# Patient Record
Sex: FEMALE | Race: WHITE | NOT HISPANIC OR LATINO | Employment: UNEMPLOYED | URBAN - METROPOLITAN AREA
[De-identification: names, ages, dates, MRNs, and addresses within clinical notes are randomized per-mention and may not be internally consistent; named-entity substitution may affect disease eponyms.]

---
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
## 2018-09-25 ENCOUNTER — HOSPITAL ENCOUNTER (EMERGENCY)
Facility: HOSPITAL | Age: 18
Discharge: HOME | End: 2018-09-25
Attending: EMERGENCY MEDICINE | Admitting: EMERGENCY MEDICINE

## 2018-09-25 VITALS
DIASTOLIC BLOOD PRESSURE: 70 MMHG | RESPIRATION RATE: 20 BRPM | WEIGHT: 285.27 LBS | TEMPERATURE: 98.5 F | HEART RATE: 115 BPM | BODY MASS INDEX: 44.78 KG/M2 | HEIGHT: 67 IN | SYSTOLIC BLOOD PRESSURE: 142 MMHG | OXYGEN SATURATION: 99 %

## 2018-09-25 DIAGNOSIS — L03.012 CELLULITIS OF FINGER OF LEFT HAND: Primary | ICD-10-CM

## 2018-09-25 PROCEDURE — 99281 EMR DPT VST MAYX REQ PHY/QHP: CPT

## 2018-09-25 RX ORDER — CLINDAMYCIN HYDROCHLORIDE 150 MG/1
300 CAPSULE ORAL ONCE
Status: COMPLETED | OUTPATIENT
Start: 2018-09-25 | End: 2018-09-25

## 2018-09-25 RX ORDER — CLINDAMYCIN HYDROCHLORIDE 300 MG/1
300 CAPSULE ORAL EVERY 8 HOURS SCHEDULED
Qty: 21 CAPSULE | Refills: 0 | Status: SHIPPED | OUTPATIENT
Start: 2018-09-25 | End: 2018-10-02

## 2018-09-25 RX ADMIN — CLINDAMYCIN HYDROCHLORIDE 300 MG: 150 CAPSULE ORAL at 02:46

## 2018-09-25 NOTE — DISCHARGE INSTRUCTIONS
Cellulitis, Ambulatory Care   GENERAL INFORMATION:   Cellulitis  is a skin infection caused by bacteria  Common symptoms include the following:   · Fever    · A red, warm, swollen area on your skin    · Pain when the area is touched    · Bumps or blisters (abscess) that may drain pus    · Bumpy, raised skin that feels like an orange peel  Seek immediate care for the following symptoms:   · An increase in pain, redness, warmth, and size    · Red streaks coming from the infected area    · A thin, gray-brown discharge coming from your infected skin area    · A crackling under your skin when you touch it    · Purple dots or bumps on your skin, or bleeding under your skin    · New swelling and pain in your legs    · Sudden trouble breathing or chest pain  Treatment for cellulitis  may include medicines to treat the bacterial infection or decrease pain  The infection may need to be cleaned out  Damaged, dead, or infected tissue may need to be cut away to help your wound heal   Manage your symptoms:   · Elevate your wound above the level of your heart  as often as you can  This will help decrease swelling and pain  Prop your wound on pillows or blankets to keep it elevated comfortably  · Clean your wound as directed  You may need to wash the wound with soap and water  Look for signs of infection  · Wear pressure stockings as directed  The stockings are tight and put pressure on your legs  This improves blood flow and decreases swelling  Prevent cellulitis:   · Wash your hands often  Use soap and water  Wash your hands after you use the bathroom, change a child's diapers, or sneeze  Wash your hands before you prepare or eat food  Use lotion to prevent dry, cracked skin  · Do not share personal items, such as towels, clothing, and razors  · Clean exercise equipment  with germ-killing  before and after you use it    Follow up with your healthcare provider as directed:  Write down your questions so you remember to ask them during your visits  CARE AGREEMENT:   You have the right to help plan your care  Learn about your health condition and how it may be treated  Discuss treatment options with your caregivers to decide what care you want to receive  You always have the right to refuse treatment  The above information is an  only  It is not intended as medical advice for individual conditions or treatments  Talk to your doctor, nurse or pharmacist before following any medical regimen to see if it is safe and effective for you  © 2014 [address] is for End User's use only and may not be sold, redistributed or otherwise used for commercial purposes  All illustrations and images included in CareNotes® are the copyrighted property of A D A M , Inc  or [facility]  Cellulitis   WHAT YOU NEED TO KNOW:   Cellulitis is a skin infection caused by bacteria  Cellulitis may go away on its own or you may need treatment  Your healthcare provider may draw a [place] around the outside edges of your cellulitis  If your cellulitis spreads, your healthcare provider will see it outside of the [place]  DISCHARGE INSTRUCTIONS:   Call 911 if:   · You have sudden trouble breathing or chest pain  Return to the emergency department if:   · Your wound gets larger and more painful  · You feel a crackling under your skin when you touch it  · You have purple dots or bumps on your skin, or you see bleeding under your skin  · You have new swelling and pain in your legs  · The red, warm, swollen area gets larger  · You see red streaks coming from the infected area  Contact your healthcare provider if:   · You have a fever  · Your fever or pain does not go away or gets worse  · The area does not get smaller after 2 days of antibiotics  · Your skin is flaking or peeling off      · You have questions or concerns about your condition or care   Medicines:   · Antibiotics  help treat the bacterial infection  · NSAIDs , such as ibuprofen, help decrease swelling, pain, and fever  NSAIDs can cause stomach bleeding or kidney problems in certain people  If you take blood thinner medicine, always ask if NSAIDs are safe for you  Always read the medicine label and follow directions  Do not give these medicines to children under 10months of age without direction from your child's healthcare provider  · Acetaminophen  decreases pain and fever  It is available without a doctor's order  Ask how much to take and how often to take it  Follow directions  Read the labels of all other medicines you are using to see if they also contain acetaminophen, or ask your doctor or pharmacist  Acetaminophen can cause liver damage if not taken correctly  Do not use more than 4 grams (4,000 milligrams) total of acetaminophen in one day  · Take your medicine as directed  Contact your healthcare provider if you think your medicine is not helping or if you have side effects  Tell him or her if you are allergic to any medicine  Keep a list of the medicines, vitamins, and herbs you take  Include the amounts, and when and why you take them  Bring the list or the pill bottles to follow-up visits  Carry your medicine list with you in case of an emergency  Self-care:   · Elevate the area above the level of your heart  as often as you can  This will help decrease swelling and pain  Prop the area on pillows or blankets to keep it elevated comfortably  · Clean the area daily until the wound scabs over  Gently wash the area with soap and water  Pat dry  Use dressings as directed  · Place cool or warm, wet cloths on the area as directed  Use clean cloths and clean water  Leave it on the area until the cloth is room temperature  Pat the area dry with a clean, dry cloth  The cloths may help decrease pain    Prevent cellulitis:   · Do not scratch bug bites or areas of injury  You increase your risk for cellulitis by scratching these areas  · Do not share personal items, such as towels, clothing, and razors  · Clean exercise equipment  with germ-killing  before and after you use it  · Wash your hands often  Use soap and water  Wash your hands after you use the bathroom, change a child's diapers, or sneeze  Wash your hands before you prepare or eat food  Use lotion to prevent dry, cracked skin  · Wear pressure stockings as directed  You may be told to wear the stockings if you have peripheral edema  The stockings improve blood flow and decrease swelling  · Treat athlete's foot  This can help prevent the spread of a bacterial skin infection  Follow up with your healthcare provider within 3 days, or as directed: Your healthcare provider will check if your cellulitis is getting better  You may need different medicine  Write down your questions so you remember to ask them during your visits  © 2017 [address] [facility] Information is for End User's use only and may not be sold, redistributed or otherwise used for commercial purposes  All illustrations and images included in CareNotes® are the copyrighted property of A D A M , Inc  or Ori Mccann  The above information is an  only  It is not intended as medical advice for individual conditions or treatments  Talk to your doctor, nurse or pharmacist before following any medical regimen to see if it is safe and effective for you

## 2018-09-25 NOTE — ED PROVIDER NOTES
History  Chief Complaint   Patient presents with   [address]     Per Pt " I think I got bit by a spider  i notice it last Thursday  It is painful "      Patient is a 57-year-old female with complaints of left ring finger infection that began 4 days ago  Patient does not know injury  Fever, chills  Patient states that she did squeeze it initially and some pus came out of it  Patient denies radiating pain, numbness, tingling  Insect Bite   Associated symptoms: no fever        Prior to Admission Medications   Prescriptions Last Dose Informant Patient Reported? Taking? FLUoxetine (PROzac) 20 MG tablet   Yes No   Sig: Take 20 mg by mouth daily   buPROPion (WELLBUTRIN XL) 150 mg 24 hr tablet   Yes No   Sig: Take 150 mg by mouth daily   gabapentin (NEURONTIN) 300 mg capsule   Yes No   Sig: Take 100 mg by mouth 3 (three) times a day   omeprazole (PriLOSEC) 20 mg delayed release capsule   Yes No   Sig: Take 20 mg by mouth daily      Facility-Administered Medications: None       Past Medical History:   Diagnosis Date    Anxiety     Bipolar 1 disorder ([facility] [place] [facility] )     Chemical pneumonitis ([facility] [place] [facility] )     Depression     Gastritis     PTSD (post-traumatic stress disorder)        Past Surgical History:   Procedure Laterality Date    WISDOM TOOTH EXTRACTION         History reviewed  No pertinent family history  I have reviewed and agree with the history as documented  Social History   Substance Use Topics    Smoking status: Current Every Day Smoker     Packs/day: 0 25     Types: Cigarettes    Smokeless tobacco: Never Used    Alcohol use No        Review of Systems   Constitutional: Negative for fever  Respiratory: Negative for shortness of breath  Cardiovascular: Negative for chest pain  Skin: Positive for wound  All other systems reviewed and are negative  Physical Exam  Physical Exam   Constitutional: She is oriented to person, place, and time  She appears well-developed and well-nourished  HENT:   Head: Normocephalic and atraumatic  Eyes: Conjunctivae and EOM are normal  Pupils are equal, round, and reactive to light  Cardiovascular: Normal rate, regular rhythm and normal heart sounds  Pulmonary/Chest: Effort normal    Musculoskeletal:        Hands:  Range of motion of the fingers full  There is no evidence of infectious flexor tenosynovitis  Neurological: She is alert and oriented to person, place, and time  Psychiatric: She has a normal mood and affect  Her behavior is normal  Judgment and thought content normal    Vitals reviewed  Vital Signs  ED Triage Vitals [09/25/18 0223]   Temperature Pulse Respirations Blood Pressure SpO2   98 5 °F (36 9 °C) (!) 115 20 142/70 99 %      Temp Source Heart Rate Source Patient Position - Orthostatic VS BP Location FiO2 (%)   Oral Monitor Sitting Right arm --      Pain Score       --           Vitals:    09/25/18 0223   BP: 142/70   Pulse: (!) 115   Patient Position - Orthostatic VS: Sitting       Visual Acuity      ED Medications  Medications   clindamycin (CLEOCIN) capsule 300 mg (300 mg Oral Given 9/25/18 0246)       Diagnostic Studies  Results Reviewed     None                 No orders to display              Procedures  Procedures       Phone Contacts  ED Phone Contact    ED Course                               MDM  Number of Diagnoses or Management Options  Cellulitis of finger of left hand: new and does not require workup  Diagnosis management comments: History and physical exam consistent with small abscess and cellulitis left ring finger  There is no fluctuance noted for fluid collection  Recommend treatment with oral clindamycin  There is no indication for I and D at this time  Follow up with family doctor in 2-3 days      Risk of Complications, Morbidity, and/or Mortality  Presenting problems: moderate  Diagnostic procedures: moderate  Management options: moderate    Patient Progress  Patient progress: stable    CritCare Time    Disposition  Final diagnoses:   Cellulitis of finger of left hand     Time reflects when diagnosis was documented in both MDM as applicable and the Disposition within this note     Time User Action Codes Description Comment    9/25/2018  2:42 AM Aleida Larsen Add [L03 [id number]] Cellulitis of finger of left hand       ED Disposition     ED Disposition Condition Comment    Discharge  Sharath Eleuterio discharge to home/self care  Condition at discharge: Stable        Follow-up Information     Follow up With Specialties Details Why Contact Info    PCP  In 2 days            Discharge Medication List as of 9/25/2018  2:45 AM      START taking these medications    Details   clindamycin (CLEOCIN) 300 MG capsule Take 1 capsule (300 mg total) by mouth every 8 (eight) hours for 7 days, Starting Tue 9/25/2018, Until Tue 10/2/2018, Print         CONTINUE these medications which have NOT CHANGED    Details   buPROPion (WELLBUTRIN XL) 150 mg 24 hr tablet Take 150 mg by mouth daily, Historical Med      FLUoxetine (PROzac) 20 MG tablet Take 20 mg by mouth daily, Historical Med      gabapentin (NEURONTIN) 300 mg capsule Take 100 mg by mouth 3 (three) times a day, Historical Med      omeprazole (PriLOSEC) 20 mg delayed release capsule Take 20 mg by mouth daily, Historical Med           No discharge procedures on file      ED Provider  Electronically Signed by           Yasmeen Terry PA-C  09/25/18 0745

## 2018-10-10 ENCOUNTER — OFFICE VISIT (OUTPATIENT)
Dept: OBGYN CLINIC | Facility: CLINIC | Age: 18
End: 2018-10-10

## 2018-10-10 VITALS
DIASTOLIC BLOOD PRESSURE: 82 MMHG | BODY MASS INDEX: 44.89 KG/M2 | HEIGHT: 67 IN | SYSTOLIC BLOOD PRESSURE: 132 MMHG | WEIGHT: 286 LBS

## 2018-10-10 DIAGNOSIS — Z11.3 SCREEN FOR STD (SEXUALLY TRANSMITTED DISEASE): ICD-10-CM

## 2018-10-10 DIAGNOSIS — Z01.419 WELL WOMAN EXAM WITH ROUTINE GYNECOLOGICAL EXAM: Primary | ICD-10-CM

## 2018-10-10 PROCEDURE — 87491 CHLMYD TRACH DNA AMP PROBE: CPT | Performed by: OBSTETRICS & GYNECOLOGY

## 2018-10-10 PROCEDURE — 87591 N.GONORRHOEAE DNA AMP PROB: CPT | Performed by: OBSTETRICS & GYNECOLOGY

## 2018-10-10 PROCEDURE — 99385 PREV VISIT NEW AGE 18-39: CPT | Performed by: OBSTETRICS & GYNECOLOGY

## 2018-10-10 NOTE — PROGRESS NOTES
ASSESSMENT & PLAN: Edilberto Smith is a 19 y o  Ibis Martin with normal gynecologic exam     1   Routine well woman exam done today  2  Pap: Will begin cervical surveillance  3  STD testing  was performed today  4  Gardasil recommendations reviewed  Patient has previously been vaccinated  5  The following were reviewed in today's visit: breast self exam, STD testing, use and side effects of OCPs, adequate intake of calcium and vitamin D, exercise and healthy diet  6  Contraception : none  CC:  Annual Gynecologic Examination    HPI: Edilberto Smith is a 19 y o  Ibis Martin who presents for annual gynecologic examination  She has the following concerns:  none    Health Maintenance:    She exercises 1 days per week with cardio  She wears her seatbelt routinely  She does not perform regular monthly self breast exams  She feels safe at home  Patients does not follow a particular diet  Her last pap was : never      Past Medical History:   Diagnosis Date    Anxiety     B[facility] disorder ([facility] )     Chemical pneumonitis ([facility] )     Depression     Gastritis     PTSD (post-traumatic stress disorder)        Past Surgical History:   Procedure Laterality Date    WISDOM TOOTH EXTRACTION         Past OB/Gyn History:  OB History      Para Term  AB Living    0 0 0 0 0 0    SAB TAB Ectopic Multiple Live Births    0 0 0 0 0      ,  Period Cycle (Days): 30  Period Duration (Days): 4-5  Period Pattern: Regular  Menstrual Flow: Heavy  Menstrual Control: Tampon, Thin pad  Dysmenorrhea: (!) Moderate  Dysmenorrhea Symptoms: Cramping, HeadachePatient's last menstrual period was 2018  History of sexually transmitted infection No  History of abnormal pap smears  Never had a pap     Patient is currently sexually active  homosexual Birth control: none        Family History   Problem Relation Age of Onset    Stroke Mother     Heart disease Mother     Bipolar disorder Father     Heart disease Sister    Nona Conte Ovarian cysts Sister        Social History:  Social History     Social History    Marital status: Single     Spouse name: N/A    Number of children: N/A    Years of education: N/A     Occupational History    Not on file  Social History Main Topics    Smoking status: Current Every Day Smoker     Packs/day: 0 25     Types: Cigarettes    Smokeless tobacco: Never Used    Alcohol use Yes      Comment: social    Drug use: No    Sexual activity: Yes     Partners: Female     Other Topics Concern    Not on file     Social History Narrative    No narrative on file     Presently lives with parents  Patient is same sex partner  Patient is currently employed [employer]    No Known Allergies  No current outpatient prescriptions on file  Review of Systems:  A complete review of systems was performed and was negative, except as listed  none    Physical Exam:  /82   Ht 5' 7" (1 702 m)   Wt 130 kg (286 lb)   LMP 09/17/2018   BMI 44 79 kg/m²    GEN: The patient was alert and oriented x3, pleasant well-appearing female in no acute distress  HEENT:  Unremarkable, no anterior or posterior lymphadenopathy, no thyromegaly  CV:  RRR, no murmurs  RESP:  Clear to auscultation bilaterally  BREAST:  Symmetric breasts with no palpable breast masses or obvious breast lesions  She has no retractions or nipple discharge  She has no axillary abnormalities or palpable masses  Self breast exam is taught  ABD:  Soft, nontender, nondistended, normoactive bowel sounds,   EXT:  WWP, nontender, no edema  BACK:  No CVA tenderness, no tenderness to palpation along spine  PELVIC:  Normal appearing external female genitalia, normal vaginal epithelium, No discharge  Cervix present   Bimanual: absent CMT,  normal uterus, non-tender  No palpable adnexal masses

## 2018-10-11 ENCOUNTER — LAB (OUTPATIENT)
Dept: LAB | Facility: CLINIC | Age: 18
End: 2018-10-11

## 2018-10-11 DIAGNOSIS — Z11.3 SCREEN FOR STD (SEXUALLY TRANSMITTED DISEASE): ICD-10-CM

## 2018-10-11 LAB
CHLAMYDIA DNA CVX QL NAA+PROBE: NORMAL
N GONORRHOEA DNA GENITAL QL NAA+PROBE: NORMAL

## 2018-10-11 PROCEDURE — 87340 HEPATITIS B SURFACE AG IA: CPT

## 2018-10-11 PROCEDURE — 87389 HIV-1 AG W/HIV-1&-2 AB AG IA: CPT

## 2018-10-11 PROCEDURE — 36415 COLL VENOUS BLD VENIPUNCTURE: CPT

## 2018-10-11 PROCEDURE — 86592 SYPHILIS TEST NON-TREP QUAL: CPT

## 2018-10-12 LAB
HBV SURFACE AG SER QL: NORMAL
HIV 1+2 AB+HIV1 P24 AG SERPL QL IA: NORMAL
RPR SER QL: NORMAL

## 2018-10-15 ENCOUNTER — HOSPITAL ENCOUNTER (EMERGENCY)
Facility: HOSPITAL | Age: 18
Discharge: HOME | End: 2018-10-15
Attending: EMERGENCY MEDICINE

## 2018-10-15 VITALS
RESPIRATION RATE: 18 BRPM | TEMPERATURE: 98.4 F | HEART RATE: 94 BPM | BODY MASS INDEX: 44.89 KG/M2 | WEIGHT: 286.6 LBS | OXYGEN SATURATION: 98 % | SYSTOLIC BLOOD PRESSURE: 153 MMHG | DIASTOLIC BLOOD PRESSURE: 89 MMHG

## 2018-10-15 DIAGNOSIS — N39.0 UTI (URINARY TRACT INFECTION): Primary | ICD-10-CM

## 2018-10-15 LAB
BACTERIA UR QL AUTO: ABNORMAL /HPF
BILIRUB UR QL STRIP: NEGATIVE
CLARITY UR: CLEAR
COLOR UR: ABNORMAL
EXT PREG TEST URINE: NEGATIVE
GLUCOSE UR STRIP-MCNC: NEGATIVE MG/DL
HGB UR QL STRIP.AUTO: ABNORMAL
KETONES UR STRIP-MCNC: NEGATIVE MG/DL
LEUKOCYTE ESTERASE UR QL STRIP: ABNORMAL
NITRITE UR QL STRIP: NEGATIVE
NON-SQ EPI CELLS URNS QL MICRO: ABNORMAL /HPF
PH UR STRIP.AUTO: 6 [PH] (ref 4.5–8)
PROT UR STRIP-MCNC: ABNORMAL MG/DL
RBC #/AREA URNS AUTO: ABNORMAL /HPF
SP GR UR STRIP.AUTO: 1.02 (ref 1–1.03)
UROBILINOGEN UR QL STRIP.AUTO: 0.2 E.U./DL
WBC #/AREA URNS AUTO: ABNORMAL /HPF

## 2018-10-15 PROCEDURE — 81001 URINALYSIS AUTO W/SCOPE: CPT | Performed by: EMERGENCY MEDICINE

## 2018-10-15 PROCEDURE — 99283 EMERGENCY DEPT VISIT LOW MDM: CPT

## 2018-10-15 PROCEDURE — 81025 URINE PREGNANCY TEST: CPT | Performed by: EMERGENCY MEDICINE

## 2018-10-15 RX ORDER — CEPHALEXIN 250 MG/1
500 CAPSULE ORAL ONCE
Status: COMPLETED | OUTPATIENT
Start: 2018-10-15 | End: 2018-10-15

## 2018-10-15 RX ORDER — CEPHALEXIN 500 MG/1
500 CAPSULE ORAL 2 TIMES DAILY
Qty: 14 CAPSULE | Refills: 0 | Status: SHIPPED | OUTPATIENT
Start: 2018-10-15 | End: 2018-10-22

## 2018-10-15 RX ADMIN — CEPHALEXIN 500 MG: 250 CAPSULE ORAL at 22:28

## 2018-10-16 NOTE — ED PROVIDER NOTES
History  Chief Complaint   Patient presents with    Possible UTI     Sharp feeling when urinating  Onset Saturday  19year-old female presents with 1 day of dysuria noted as moderate sharp pain at the end of her stream associated with frequency and urgency  Some hematuria yesterday, none today  Patient notes urination aggravates the pain and nothing alleviates it  ROS: Patient associates diarrhea that is chronic due to gastritis and mild suprapubic that she describes as a soreness  Patient does not some mild vaginal discharge, just had evaluation by OBGYN, results reviewed with negative GC endocervical testing; denies fever/chills, nausea/vomiting, flank pain, dyspnea, anorexia, chest pain, vaginal bleeding or odor, or back/neck pain  Patient affirms history of UTIs  Patient denies pregnancy  Patient denies recent unprotected sexual activities  Objective:  Vital signs reviewed  Constitutional:  Well-nourished, well-developed  No acute distress  HENT: Head normocephalic  Pharynx moist   CV:  Regular rate  Respiratory:  Non-labored  Abdomen:  Normal inspection of an obese abdomen without previous abdominal surgical incisions noted and without erythema, rashes or ecchymosis noted  No abdominal pulsations noted  Soft abdomen  No tnderness to palpitation in the suprapubic region with no tenderness in the remainder of the abdominal fields, including none over McBurney's point  No masses noted on examination  No rebound or guarding noted on examination, non-peritoneal exam     Back:  Costovertebral tenderness not present  Skin:  No ecchymosis of the umbilicus (negative Oolitic's sign) or flank (negative Grey Nguyen's sign)  Warm and dry  Neuro:  Alert  Answers questions appropriately  Psych: Normal mood and affect  Medical Decision Making    Patient presents with signs and symptoms most consistent with cystitis    Differential includes cystitis, urethritis, vaginitis, pelvic inflammatory disease, nephrolithiasis, and pyelonephritis  Patient denies vaginal symptoms making vaginitis and pelvic inflammatory disease less likely  Patient denies flank pain so current symptoms and exams inconsistent with pyelonephritis or nephrolithiasis  Most likely cystitis based on patient's history and examination  Urinalysis will be completed, including urine culture if indicated by initial microscopy  Urine pregnancy will be completed  Patient does note vaginal discharge but declines evaluation regarding this and had recent evaluation by OBGYN, including negative GC testing that I reviewed in the medical record  Patient denies current treatment for cystitis  Patient denies poorly controlled diabetes, chronic kidney disease, history of congenital urinary abnormality or renal transplant, or history of immunocompromised state  As such, patient will be treated for acute uncomplicated cystitis  Initial dose of antibiotic will be given in the ER  Patient instructed on urinary tract infections and we have discussed return precautions in detail  None       Past Medical History:   Diagnosis Date    ADHD     Anxiety     Bipolar 1 disorder (HCC)     Chemical pneumonitis (HCC)     Depression     Gastritis     PTSD (post-traumatic stress disorder)        Past Surgical History:   Procedure Laterality Date    WISDOM TOOTH EXTRACTION         Family History   Problem Relation Age of Onset    Stroke Mother     Heart disease Mother     Bipolar disorder Father     Heart disease Sister     Ovarian cysts Sister      I have reviewed and agree with the history as documented  Social History   Substance Use Topics    Smoking status: Current Every Day Smoker     Packs/day: 0 50     Types: Cigarettes    Smokeless tobacco: Never Used    Alcohol use Yes      Comment: social        Review of Systems   All other systems reviewed and are negative        Physical Exam  Physical Exam    Vital Signs  ED Triage Vitals [10/15/18 2036]   Temperature Pulse Respirations Blood Pressure SpO2   98 4 °F (36 9 °C) 94 18 153/89 98 %      Temp Source Heart Rate Source Patient Position - Orthostatic VS BP Location FiO2 (%)   Oral Monitor Sitting Left arm --      Pain Score       No Pain           Vitals:    10/15/18 2036   BP: 153/89   Pulse: 94   Patient Position - Orthostatic VS: Sitting       Visual Acuity      ED Medications  Medications   cephalexin (KEFLEX) capsule 500 mg (500 mg Oral Given 10/15/18 2228)       Diagnostic Studies  Results Reviewed     Procedure Component Value Units Date/Time    Urine Microscopic [[id number]]  (Abnormal) Collected:  10/15/18 2129    Lab Status:  Final result Specimen:  Urine from Urine, Clean Catch Updated:  10/15/18 2148     RBC, UA 4-10 (A) /hpf      WBC, UA 4-10 (A) /hpf      Epithelial Cells Occasional /hpf      Bacteria, UA Moderate (A) /hpf     UA w Reflex to Microscopic w Reflex to Culture [[id number]]  (Abnormal) Collected:  10/15/18 2129    Lab Status:  Final result Specimen:  Urine from Urine, Clean Catch Updated:  10/15/18 2137     Color, UA Light Yellow     Clarity, UA Clear     Specific [place], UA 1 020     pH, UA 6 0     Leukocytes, UA Trace (A)     Nitrite, UA Negative     Protein, UA Trace (A) mg/dl      Glucose, UA Negative mg/dl      Ketones, UA Negative mg/dl      Urobilinogen, UA 0 2 E U /dl      Bilirubin, UA Negative     Blood, UA Moderate (A)    POCT pregnancy, urine [[id number]]  (Normal) Resulted:  10/15/18 2126    Lab Status:  Final result Updated:  10/15/18 2126     EXT PREG TEST UR (Ref: Negative) negative                 No orders to display              Procedures  Procedures       Phone Contacts  ED Phone Contact    ED Course  ED Course as of Oct 21 2122   Mon Oct 15, 2018   2208 Discussed findings of urinalysis with the patient  Will treat patient with Keflex and a follow for urinary culture  Patient has no flank pain or fever/chills    Discussed signs and symptoms of pyelonephritis  Treating with cephalexin which should cover regardless  Discussed again patient's vaginal discharge in after discussion of risks and benefits, patient deferred testing at this point  Discussed [id number] with OBGYN if symptoms continue  Patient's recent GC testing was negative with no new sexual partners  Discussed follow up and return precautions  Discussed symptomatic management, patient declined herbert  Discussed NSAIDs  MDM  CritCare Time    Disposition  Final diagnoses:   UTI (urinary tract infection)     Time reflects when diagnosis was documented in both MDM as applicable and the Disposition within this note     Time User Action Codes Description Comment    10/15/2018 10:04 PM Linda Quinones Add [N39 0] UTI (urinary tract infection)       ED Disposition     ED Disposition Condition Comment    Discharge  Ritu Lopez discharge to home/self care  Condition at discharge: Stable        Follow-up Information     Follow up With Specialties Details Why [address] [facility], [place] [facility] Medicine Schedule an appointment as soon as possible for a visit in 3 days Follow up and reassessment  [address] [place] [address]  [phone]            Discharge Medication List as of 10/15/2018 10:05 PM      START taking these medications    Details   cephalexin (KEFLEX) 500 mg capsule Take 1 capsule (500 mg total) by mouth 2 (two) times a day for 7 days, Starting Mon 10/15/2018, Until Mon 10/22/2018, Print           No discharge procedures on file      ED Provider  Electronically Signed by           Adriane Joe MD  10/21/18 5733

## 2018-10-16 NOTE — DISCHARGE INSTRUCTIONS

## 2019-01-26 ENCOUNTER — APPOINTMENT (EMERGENCY)
Dept: CT IMAGING | Facility: HOSPITAL | Age: 19
End: 2019-01-26

## 2019-01-26 ENCOUNTER — HOSPITAL ENCOUNTER (EMERGENCY)
Facility: HOSPITAL | Age: 19
Discharge: HOME | End: 2019-01-26
Attending: EMERGENCY MEDICINE | Admitting: EMERGENCY MEDICINE

## 2019-01-26 VITALS
TEMPERATURE: 98.1 F | BODY MASS INDEX: 42.38 KG/M2 | RESPIRATION RATE: 18 BRPM | WEIGHT: 270 LBS | DIASTOLIC BLOOD PRESSURE: 56 MMHG | OXYGEN SATURATION: 99 % | HEART RATE: 77 BPM | SYSTOLIC BLOOD PRESSURE: 116 MMHG | HEIGHT: 67 IN
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
## 2019-01-26 DIAGNOSIS — S66.911A WRIST STRAIN, RIGHT, INITIAL ENCOUNTER: ICD-10-CM

## 2019-01-26 DIAGNOSIS — S39.012A STRAIN OF LUMBAR REGION, INITIAL ENCOUNTER: ICD-10-CM

## 2019-01-26 LAB
ANION GAP BLD CALC-SCNC: 18 MMOL/L (ref 4–13)
BUN BLD-MCNC: 14 MG/DL (ref 5–25)
CA-I BLD-SCNC: 1.22 MMOL/L (ref 1.12–1.32)
CHLORIDE BLD-SCNC: 104 MMOL/L (ref 100–108)
CREAT BLD-MCNC: 0.7 MG/DL (ref 0.6–1.3)
EXT PREG TEST URINE: NEGATIVE
GFR SERPL CREATININE-BSD FRML MDRD: 125 ML/MIN/1.73SQ M
GLUCOSE SERPL-MCNC: 90 MG/DL (ref 65–140)
HCT VFR BLD CALC: 42 % (ref 34.8–46.1)
HGB BLDA-MCNC: 14.3 G/DL (ref 11.5–15.4)
PCO2 BLD: 24 MMOL/L (ref 21–32)
POTASSIUM BLD-SCNC: 3.5 MMOL/L (ref 3.5–5.3)
SODIUM BLD-SCNC: 141 MMOL/L (ref 136–145)
SPECIMEN SOURCE: ABNORMAL

## 2019-01-26 PROCEDURE — 80047 BASIC METABLC PNL IONIZED CA: CPT

## 2019-01-26 PROCEDURE — 74177 CT ABD & PELVIS W/CONTRAST: CPT

## 2019-01-26 PROCEDURE — 70450 CT HEAD/BRAIN W/O DYE: CPT

## 2019-01-26 PROCEDURE — 81025 URINE PREGNANCY TEST: CPT | Performed by: EMERGENCY MEDICINE

## 2019-01-26 PROCEDURE — 99284 EMERGENCY DEPT VISIT MOD MDM: CPT

## 2019-01-26 PROCEDURE — 85014 HEMATOCRIT: CPT

## 2019-01-26 RX ADMIN — IOHEXOL 100 ML: 350 INJECTION, SOLUTION INTRAVENOUS at 01:07
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
## 2019-04-29 ENCOUNTER — HOSPITAL ENCOUNTER (EMERGENCY)
Facility: HOSPITAL | Age: 19
Discharge: HOME | End: 2019-04-29
Attending: EMERGENCY MEDICINE

## 2019-04-29 VITALS
TEMPERATURE: 97.9 F | WEIGHT: 279.1 LBS | OXYGEN SATURATION: 98 % | SYSTOLIC BLOOD PRESSURE: 166 MMHG | HEIGHT: 67 IN | BODY MASS INDEX: 43.81 KG/M2 | DIASTOLIC BLOOD PRESSURE: 79 MMHG | HEART RATE: 84 BPM | RESPIRATION RATE: 18 BRPM

## 2019-04-29 DIAGNOSIS — B35.4 TINEA CORPORIS: Primary | ICD-10-CM

## 2019-04-29 PROCEDURE — 99283 EMERGENCY DEPT VISIT LOW MDM: CPT | Performed by: PHYSICIAN ASSISTANT

## 2019-04-29 PROCEDURE — 99282 EMERGENCY DEPT VISIT SF MDM: CPT

## 2019-04-29 RX ORDER — CLOTRIMAZOLE 1 %
1 CREAM (GRAM) TOPICAL 2 TIMES DAILY
Qty: 45 G | Refills: 0 | Status: SHIPPED | OUTPATIENT
Start: 2019-04-29 | End: 2022-04-24

## 2019-09-22 ENCOUNTER — HOSPITAL ENCOUNTER (EMERGENCY)
Facility: HOSPITAL | Age: 19
Discharge: HOME | End: 2019-09-22
Attending: EMERGENCY MEDICINE | Admitting: EMERGENCY MEDICINE
Payer: COMMERCIAL

## 2019-09-22 VITALS
SYSTOLIC BLOOD PRESSURE: 139 MMHG | RESPIRATION RATE: 16 BRPM | HEART RATE: 91 BPM | TEMPERATURE: 98.2 F | WEIGHT: 279.32 LBS | BODY MASS INDEX: 43.75 KG/M2 | OXYGEN SATURATION: 96 % | DIASTOLIC BLOOD PRESSURE: 80 MMHG

## 2019-09-22 DIAGNOSIS — N39.0 UTI (URINARY TRACT INFECTION): Primary | ICD-10-CM

## 2019-09-22 LAB
BACTERIA UR QL AUTO: ABNORMAL /HPF
BILIRUB UR QL STRIP: NEGATIVE
CLARITY UR: ABNORMAL
COLOR UR: YELLOW
EXT PREG TEST URINE: NEGATIVE
EXT. CONTROL ED NAV: NORMAL
GLUCOSE UR STRIP-MCNC: NEGATIVE MG/DL
HGB UR QL STRIP.AUTO: ABNORMAL
KETONES UR STRIP-MCNC: NEGATIVE MG/DL
LEUKOCYTE ESTERASE UR QL STRIP: NEGATIVE
NITRITE UR QL STRIP: NEGATIVE
NON-SQ EPI CELLS URNS QL MICRO: ABNORMAL /HPF
PH UR STRIP.AUTO: 5.5 [PH]
PROT UR STRIP-MCNC: ABNORMAL MG/DL
RBC #/AREA URNS AUTO: ABNORMAL /HPF
SP GR UR STRIP.AUTO: >=1.03 (ref 1–1.03)
UROBILINOGEN UR QL STRIP.AUTO: 0.2 E.U./DL
WBC #/AREA URNS AUTO: ABNORMAL /HPF

## 2019-09-22 PROCEDURE — 99283 EMERGENCY DEPT VISIT LOW MDM: CPT

## 2019-09-22 PROCEDURE — 99284 EMERGENCY DEPT VISIT MOD MDM: CPT | Performed by: EMERGENCY MEDICINE

## 2019-09-22 PROCEDURE — 81025 URINE PREGNANCY TEST: CPT | Performed by: EMERGENCY MEDICINE

## 2019-09-22 PROCEDURE — 87147 CULTURE TYPE IMMUNOLOGIC: CPT | Performed by: EMERGENCY MEDICINE

## 2019-09-22 PROCEDURE — 81001 URINALYSIS AUTO W/SCOPE: CPT | Performed by: EMERGENCY MEDICINE

## 2019-09-22 PROCEDURE — 87086 URINE CULTURE/COLONY COUNT: CPT | Performed by: EMERGENCY MEDICINE

## 2019-09-22 PROCEDURE — 87077 CULTURE AEROBIC IDENTIFY: CPT | Performed by: EMERGENCY MEDICINE

## 2019-09-22 RX ORDER — CEPHALEXIN 500 MG/1
500 CAPSULE ORAL 2 TIMES DAILY
Qty: 14 CAPSULE | Refills: 0 | Status: SHIPPED | OUTPATIENT
Start: 2019-09-22 | End: 2019-09-29

## 2019-09-22 RX ORDER — CEPHALEXIN 250 MG/1
500 CAPSULE ORAL ONCE
Status: COMPLETED | OUTPATIENT
Start: 2019-09-22 | End: 2019-09-22

## 2019-09-22 RX ORDER — PHENAZOPYRIDINE HYDROCHLORIDE 100 MG/1
100 TABLET, FILM COATED ORAL ONCE
Status: COMPLETED | OUTPATIENT
Start: 2019-09-22 | End: 2019-09-22

## 2019-09-22 RX ORDER — PHENAZOPYRIDINE HYDROCHLORIDE 100 MG/1
100 TABLET, FILM COATED ORAL 3 TIMES DAILY
Qty: 6 TABLET | Refills: 0 | Status: SHIPPED | OUTPATIENT
Start: 2019-09-22 | End: 2019-09-24

## 2019-09-22 RX ADMIN — CEPHALEXIN 500 MG: 250 CAPSULE ORAL at 04:54

## 2019-09-22 RX ADMIN — PHENAZOPYRIDINE 100 MG: 100 TABLET ORAL at 04:54

## 2019-09-22 NOTE — ED PROVIDER NOTES
History  Chief Complaint   Patient presents with    Painful Urination     Pt reports pain with urination x3 days with frequency that progressed to right flank pain  History provided by:  Patient  Difficulty Urinating   Pain quality:  Burning  Pain severity:  Mild  Onset quality:  Gradual  Duration:  4 days  Timing:  Constant  Progression:  Worsening  Chronicity:  New  Recent urinary tract infections: no    Relieved by:  None tried  Worsened by:  Nothing  Ineffective treatments:  None tried  Urinary symptoms: frequent urination and hematuria (pink when she wiped)    Urinary symptoms: no discolored urine, no hesitancy and no bladder incontinence    Associated symptoms: flank pain (some right flank achiness)    Associated symptoms: no abdominal pain, no fever, no nausea, no vaginal discharge and no vomiting    Risk factors: not pregnant and no recurrent urinary tract infections        Prior to Admission Medications   Prescriptions Last Dose Informant Patient Reported? Taking? clotrimazole (LOTRIMIN) 1 % cream   No No   Sig: Apply 1 g (1 application total) topically 2 (two) times a day Until rash resolves  Continue application for 1 week after the rash resolves      Facility-Administered Medications: None       Past Medical History:   Diagnosis Date    ADHD     Anxiety     Bipolar 1 disorder (HCC)     Chemical pneumonitis (HCC)     Depression     Gastritis     PTSD (post-traumatic stress disorder)        Past Surgical History:   Procedure Laterality Date    WISDOM TOOTH EXTRACTION         Family History   Problem Relation Age of Onset    Stroke Mother     Heart disease Mother     Bipolar disorder Father     Heart disease Sister     Ovarian cysts Sister      I have reviewed and agree with the history as documented      Social History     Tobacco Use    Smoking status: Current Every Day Smoker     Packs/day: 0 50     Types: Cigarettes    Smokeless tobacco: Never Used   Substance Use Topics    Alcohol use: Yes     Comment: social    Drug use: Yes     Types: Marijuana        Review of Systems   Constitutional: Negative for fever  Gastrointestinal: Negative for abdominal pain, nausea and vomiting  Genitourinary: Positive for dysuria and flank pain (some right flank achiness)  Negative for vaginal discharge  All other systems reviewed and are negative  Physical Exam  Physical Exam   Constitutional: She appears well-developed and well-nourished  No distress  HENT:   Head: Normocephalic and atraumatic  Eyes: EOM are normal    Neck: Neck supple  Cardiovascular: Normal rate  Pulmonary/Chest: Effort normal  No respiratory distress  Abdominal: Soft  She exhibits no distension  There is no tenderness  There is no CVA tenderness  Neurological: She is alert  Skin: Skin is warm  She is not diaphoretic  Psychiatric: She has a normal mood and affect  Nursing note and vitals reviewed  Vital Signs  ED Triage Vitals [09/22/19 0405]   Temperature Pulse Respirations Blood Pressure SpO2   98 2 °F (36 8 °C) 91 16 139/80 96 %      Temp Source Heart Rate Source Patient Position - Orthostatic VS BP Location FiO2 (%)   Oral Monitor -- Right arm --      Pain Score       4           Vitals:    09/22/19 0405   BP: 139/80   Pulse: 91         Visual Acuity      ED Medications  Medications   cephalexin (KEFLEX) capsule 500 mg (500 mg Oral Given 9/22/19 0454)   phenazopyridine (PYRIDIUM) tablet 100 mg (100 mg Oral Given 9/22/19 0454)       Diagnostic Studies  Results Reviewed     Procedure Component Value Units Date/Time    Urine Microscopic [[id number]]  (Abnormal) Collected:  09/22/19 0413    Lab Status:  Final result Specimen:  Urine, Clean Catch Updated:  09/22/19 0436     RBC, UA 30-50 /hpf      WBC, UA Innumerable /hpf      Epithelial Cells Occasional /hpf      Bacteria, UA Innumerable /hpf     Urine culture [[id number]] Collected:  09/22/19 0413    Lab Status:   In process Specimen:  Urine, Clean Catch Updated:  09/22/19 0436    UA w Reflex to Microscopic w Reflex to Culture [[id number]]  (Abnormal) Collected:  09/22/19 0413    Lab Status:  Final result Specimen:  Urine, Clean Catch Updated:  09/22/19 0432     Color, UA Yellow     Clarity, UA Slightly Cloudy     Specific Gravity, UA >=1 030     pH, UA 5 5     Leukocytes, UA Negative     Nitrite, UA Negative     Protein,  (2+) mg/dl      Glucose, UA Negative mg/dl      Ketones, UA Negative mg/dl      Urobilinogen, UA 0 2 E U /dl      Bilirubin, UA Negative     Blood, UA Large    POCT pregnancy, urine [[id number]]  (Normal) Resulted:  09/22/19 0419    Lab Status:  Final result Updated:  09/22/19 0419     EXT PREG TEST UR (Ref: Negative) negative     Control valid                 No orders to display              Procedures  Procedures       ED Course                               MDM  Number of Diagnoses or Management Options  UTI (urinary tract infection): new and requires workup     Amount and/or Complexity of Data Reviewed  Clinical lab tests: reviewed and ordered        Disposition  Final diagnoses:   UTI (urinary tract infection)     Time reflects when diagnosis was documented in both MDM as applicable and the Disposition within this note     Time User Action Codes Description Comment    9/22/2019  4:39 AM Tad MCALLISTER Add [N39 0] UTI (urinary tract infection)       ED Disposition     ED Disposition Condition Date/Time Comment    Discharge Stable Sun Sep 22, 2019  4:38 AM Valente Velarde discharge to home/self care              Follow-up Information     Follow up With Specialties Details Why Contact Info Additional [address] [facility] Emergency Department Emergency Medicine Go to  If symptoms worsen [address] [place] [address] ED, [address] [facility], [address], MD Family Medicine Call  If symptoms worsen [address] [facility] [address]  [phone]             Discharge Medication List as of 9/22/2019  4:39 AM      START taking these medications    Details   cephalexin (KEFLEX) 500 mg capsule Take 1 capsule (500 mg total) by mouth 2 (two) times a day for 7 days, Starting Sun 9/22/2019, Until Sun 9/29/2019, Print      phenazopyridine (PYRIDIUM) 100 mg tablet Take 1 tablet (100 mg total) by mouth 3 (three) times a day for 2 days, Starting Sun 9/22/2019, Until Tue 9/24/2019, Print         CONTINUE these medications which have NOT CHANGED    Details   clotrimazole (LOTRIMIN) 1 % cream Apply 1 g (1 application total) topically 2 (two) times a day Until rash resolves  Continue application for 1 week after the rash resolves, Starting Mon 4/29/2019, Print           No discharge procedures on file      ED Provider  Electronically Signed by           Violeta Barfield MD  09/22/19 2983

## 2019-09-23 LAB — BACTERIA UR CULT: ABNORMAL

## 2020-02-17 ENCOUNTER — HOSPITAL ENCOUNTER (EMERGENCY)
Facility: HOSPITAL | Age: 20
Discharge: HOME | End: 2020-02-17
Payer: COMMERCIAL

## 2020-02-17 VITALS
DIASTOLIC BLOOD PRESSURE: 64 MMHG | BODY MASS INDEX: 44.29 KG/M2 | WEIGHT: 282.19 LBS | RESPIRATION RATE: 18 BRPM | HEIGHT: 67 IN | HEART RATE: 81 BPM | SYSTOLIC BLOOD PRESSURE: 124 MMHG | TEMPERATURE: 98.2 F | OXYGEN SATURATION: 98 %

## 2020-02-17 DIAGNOSIS — B35.4 TINEA CORPORIS: Primary | ICD-10-CM

## 2020-02-17 PROCEDURE — 99284 EMERGENCY DEPT VISIT MOD MDM: CPT | Performed by: PHYSICIAN ASSISTANT

## 2020-02-17 PROCEDURE — 99282 EMERGENCY DEPT VISIT SF MDM: CPT

## 2020-02-17 RX ORDER — KETOCONAZOLE 20 MG/G
CREAM TOPICAL DAILY
Qty: 15 G | Refills: 0 | Status: SHIPPED | OUTPATIENT
Start: 2020-02-17 | End: 2022-04-24

## 2020-02-17 RX ORDER — ITRACONAZOLE 100 MG/1
200 CAPSULE ORAL DAILY
Qty: 14 CAPSULE | Refills: 0 | Status: SHIPPED | OUTPATIENT
Start: 2020-02-17 | End: 2020-02-24

## 2020-02-17 NOTE — ED PROVIDER NOTES
History  Chief Complaint   Patient presents with    Rash     pt c/o rash behing her lower legs for "months"      17yo female with a history of ADHD and bipolar 1 disorder presenting for evaluation of a rash on her bilateral posterior knees x several months  She was seen here in April 2019 for the same and was diagnosed with tinea corporis  She states she initially used Lotrimin cream with relief but the rash recurred several weeks later  She has been using the Lotrimin cream consistently now without relief  Her rash was only located behind her right knee but is now present bilaterally  She states she was at [facility] initially when her rash started several months ago  She has dogs at home but denies any rash or hair loss on her pets that she's noticed  Area is slightly itchy and painful if in contact with her clothing  Patient is otherwise asymptomatic  She denies fevers, chills, fatigue, bony tenderness, joint pains, and other rashes             History provided by:  Patient and medical records   used: No    Rash   Location:  Leg  Leg rash location:  L knee and R knee  Quality: dryness, itchiness, painful, redness and scaling    Quality: not blistering, not bruising, not burning, not draining, not peeling, not swelling and not weeping    Pain details:     Quality:  Unable to specify    Severity:  Mild    Onset quality:  Gradual    Timing:  Intermittent    Progression:  Unchanged  Severity:  Mild  Onset quality:  Gradual  Timing:  Constant  Progression:  Worsening  Chronicity:  New  Context: animal contact    Context: not exposure to similar rash, not new detergent/soap, not sick contacts and not sun exposure    Relieved by:  Nothing  Worsened by:  Nothing  Ineffective treatments:  Anti-fungal cream  Associated symptoms: no abdominal pain, no diarrhea, no fatigue, no fever, no headaches, no hoarse voice, no induration, no joint pain, no myalgias, no nausea, no periorbital edema, no shortness of breath, no sore throat, no throat swelling, no tongue swelling, no URI, not vomiting and not wheezing        Prior to Admission Medications   Prescriptions Last Dose Informant Patient Reported? Taking? clotrimazole (LOTRIMIN) 1 % cream   No No   Sig: Apply 1 g (1 application total) topically 2 (two) times a day Until rash resolves  Continue application for 1 week after the rash resolves      Facility-Administered Medications: None       Past Medical History:   Diagnosis Date    ADHD     Anxiety     Bipolar 1 disorder (HCC)     Chemical pneumonitis (HCC)     Depression     Gastritis     PTSD (post-traumatic stress disorder)        Past Surgical History:   Procedure Laterality Date    WISDOM TOOTH EXTRACTION         Family History   Problem Relation Age of Onset    Stroke Mother     Heart disease Mother     Bipolar disorder Father     Heart disease Sister     Ovarian cysts Sister      I have reviewed and agree with the history as documented  Social History     Tobacco Use    Smoking status: Current Every Day Smoker     Packs/day: 0 50     Types: Cigarettes    Smokeless tobacco: Never Used   Substance Use Topics    Alcohol use: Yes     Comment: social    Drug use: Yes     Types: Marijuana       Review of Systems   Constitutional: Negative for chills, fatigue and fever  HENT: Negative for congestion, drooling, hoarse voice and sore throat  Eyes: Negative for discharge and redness  Respiratory: Negative for shortness of breath and wheezing  Gastrointestinal: Negative for abdominal pain, diarrhea, nausea and vomiting  Musculoskeletal: Negative for arthralgias, joint swelling and myalgias  Skin: Positive for rash  Negative for wound  Allergic/Immunologic: Negative for immunocompromised state  Neurological: Negative for syncope, weakness and headaches  Psychiatric/Behavioral: Negative for confusion  All other systems reviewed and are negative        Physical Exam  Physical Exam   Constitutional: She appears well-developed and well-nourished  No distress  HENT:   Head: Normocephalic and atraumatic  Right Ear: External ear normal    Left Ear: External ear normal    Nose: Nose normal    Mouth/Throat: Oropharynx is clear and moist    Eyes: Conjunctivae are normal  Right eye exhibits no discharge  Left eye exhibits no discharge  No scleral icterus  Neck: Normal range of motion  Neck supple  Cardiovascular: Normal rate, regular rhythm and normal heart sounds  No murmur heard  Pulmonary/Chest: Effort normal and breath sounds normal  No stridor  No respiratory distress  She has no wheezes  She has no rales  Musculoskeletal: Normal range of motion  She exhibits no edema or deformity  Lymphadenopathy:     She has no cervical adenopathy  Neurological: She is alert  She is not disoriented  GCS eye subscore is 4  GCS verbal subscore is 5  GCS motor subscore is 6  Skin: Skin is warm and dry  She is not diaphoretic  Psychiatric: She has a normal mood and affect  Her behavior is normal    Nursing note and vitals reviewed  Vital Signs  ED Triage Vitals [02/17/20 0635]   Temperature Pulse Respirations Blood Pressure SpO2   98 2 °F (36 8 °C) 81 18 124/64 98 %      Temp Source Heart Rate Source Patient Position - Orthostatic VS BP Location FiO2 (%)   Oral Monitor Lying Right arm --      Pain Score       2           Vitals:    02/17/20 0635   BP: 124/64   Pulse: 81   Patient Position - Orthostatic VS: Lying         Visual Acuity      ED Medications  Medications - No data to display    Diagnostic Studies  Results Reviewed     None                 No orders to display              Procedures  Procedures         ED Course                   MDM  Number of Diagnoses or Management Options  Tinea corporis: new and does not require workup  Diagnosis management comments: 33-year-old female presenting for evaluation of a rash on her bilateral posterior knees    Rash began several months ago  She was seen here at symptom onset  Patient was diagnosed with tinea corporis at that time and prescribed Lotrimin which initially helped  Patient is now using Lotrimin without any relief  Area is somewhat itchy and mildly painful with contact  No drainage, fevers, or joint pains  On exam, there is an annular rash with central clearing present in bilateral popliteal fossas  Area is scaling and appears consistent with tinea  No clinical signs of infection  Given that patient failed Lotrimin, will trial patient on ketoconazole cream and PO itraconazole  Dermatology referral given for continued symptoms  ED return precautions discussed including fevers  Patient expressed understanding and is agreeable to plan  Patient discharged in stable condition  Amount and/or Complexity of Data Reviewed  Review and summarize past medical records: yes    Risk of Complications, Morbidity, and/or Mortality  Presenting problems: low  Diagnostic procedures: low  Management options: low    Patient Progress  Patient progress: stable        Disposition  Final diagnoses:   Tinea corporis     Time reflects when diagnosis was documented in both MDM as applicable and the Disposition within this note     Time User Action Codes Description Comment    2/17/2020  6:55 AM Vanessa San Add [B35 4] Tinea corporis       ED Disposition     ED Disposition Condition Date/Time Comment    Discharge Stable Mon Feb 17, 2020  6:55 AM Emily Mueller discharge to home/self care              Follow-up Information     Follow up With Specialties Details Why Contact Info Additional Information    Claudette Shade, MD Dermatology Schedule an appointment as soon as possible for a visit   [address] Emergency Department Emergency Medicine  If symptoms worsen [address] [facility] [id number] [phone] [place] ED, [address] [facility] [address] [place], [place], [address]          Discharge Medication List as of 2/17/2020  6:57 AM      START taking these medications    Details   itraconazole (SPORANOX) 100 mg capsule Take 2 capsules (200 mg total) by mouth daily for 7 days, Starting Mon 2/17/2020, Until Mon 2/24/2020, Print      ketoconazole (NIZORAL) 2 % cream Apply topically daily, Starting Mon 2/17/2020, Print         CONTINUE these medications which have NOT CHANGED    Details   clotrimazole (LOTRIMIN) 1 % cream Apply 1 g (1 application total) topically 2 (two) times a day Until rash resolves  Continue application for 1 week after the rash resolves, Starting Mon 4/29/2019, Print           No discharge procedures on file      PDMP Review     None          ED Provider  Electronically Signed by           Uche Duncan PA-C  02/17/20 7248

## 2020-02-17 NOTE — DISCHARGE INSTRUCTIONS
Use antifungal cream and pills as prescribed  Follow-up with dermatology in 2 weeks if no better  Return to ER with worsening symptoms

## 2020-03-07 ENCOUNTER — HOSPITAL ENCOUNTER (EMERGENCY)
Facility: HOSPITAL | Age: 20
Discharge: HOME | End: 2020-03-07
Attending: EMERGENCY MEDICINE
Payer: COMMERCIAL

## 2020-03-07 VITALS
WEIGHT: 288.58 LBS | BODY MASS INDEX: 45.29 KG/M2 | OXYGEN SATURATION: 97 % | SYSTOLIC BLOOD PRESSURE: 121 MMHG | RESPIRATION RATE: 16 BRPM | HEART RATE: 86 BPM | TEMPERATURE: 97.8 F | DIASTOLIC BLOOD PRESSURE: 74 MMHG | HEIGHT: 67 IN

## 2020-03-07 DIAGNOSIS — R21 RASH: ICD-10-CM

## 2020-03-07 DIAGNOSIS — B35.4 TINEA CORPORIS: Primary | ICD-10-CM

## 2020-03-07 PROCEDURE — 99284 EMERGENCY DEPT VISIT MOD MDM: CPT | Performed by: PHYSICIAN ASSISTANT

## 2020-03-07 PROCEDURE — 99282 EMERGENCY DEPT VISIT SF MDM: CPT

## 2020-03-07 RX ORDER — FLUCONAZOLE 200 MG/1
200 TABLET ORAL
Qty: 4 TABLET | Refills: 0 | Status: SHIPPED | OUTPATIENT
Start: 2020-03-07 | End: 2020-03-29

## 2020-03-07 NOTE — ED PROVIDER NOTES
History  Chief Complaint   Patient presents with    Rash     Pt diagnosed with ring worms, reports spreading from behind knees, to underneath stomach ans inner thigh     56-year-old female with past medical history significant for tinea corporis who presents to the emergency department for complaint of rash x2 days  Patient was evaluated in the emergency department on two occasions and diagnosed with tinea corporis of posterior knees and abdomen, initially treated with Lotrimin cream without success, then treated with a combination of ketoconazole cream and itraconazole oral, states rash improved but seemed to return immediately after stopping oral medication, present again in her lower abdominal folds and newly on bilateral inner thighs  She describes itching, burning, and erythema of skin  She has been using topical antifungal from last ED visit which is not improving rash  States she cleanses the area well with soap and water  She denies any systemic symptom involvement  No history of MRSA infection  She is not a diabetic or otherwise immunocompromised  Patient was given Dermatology referral at last visit but has not followed up  Prior to Admission Medications   Prescriptions Last Dose Informant Patient Reported? Taking? clotrimazole (LOTRIMIN) 1 % cream   No No   Sig: Apply 1 g (1 application total) topically 2 (two) times a day Until rash resolves   Continue application for 1 week after the rash resolves   ketoconazole (NIZORAL) 2 % cream   No No   Sig: Apply topically daily      Facility-Administered Medications: None       Past Medical History:   Diagnosis Date    ADHD     Anxiety     Bipolar 1 disorder (HCC)     Chemical pneumonitis (HCC)     Depression     Gastritis     PTSD (post-traumatic stress disorder)        Past Surgical History:   Procedure Laterality Date    WISDOM TOOTH EXTRACTION         Family History   Problem Relation Age of Onset    Stroke Mother     Heart disease Mother     Bipolar disorder Father     Heart disease Sister     Ovarian cysts Sister      I have reviewed and agree with the history as documented  E-Cigarette/Vaping     E-Cigarette/Vaping Substances     Social History     Tobacco Use    Smoking status: Current Every Day Smoker     Packs/day: 0 50     Types: Cigarettes    Smokeless tobacco: Never Used   Substance Use Topics    Alcohol use: Yes     Comment: social    Drug use: Yes     Types: Marijuana       Review of Systems   Constitutional: Negative for chills, fatigue and fever  Gastrointestinal: Negative for abdominal pain, nausea and vomiting  Skin: Positive for color change and rash  Negative for wound  Allergic/Immunologic: Negative for immunocompromised state  Neurological: Negative for weakness  Hematological: Negative for adenopathy  Physical Exam  Physical Exam   Constitutional: She is oriented to person, place, and time  She appears well-developed and well-nourished  She is cooperative  Non-toxic appearance  No distress  HENT:   Head: Normocephalic and atraumatic  Mouth/Throat: Oropharynx is clear and moist and mucous membranes are normal    Eyes: Pupils are equal, round, and reactive to light  EOM are normal    Neck: Normal range of motion  Neck supple  Cardiovascular: Normal rate, regular rhythm, normal heart sounds and intact distal pulses  No murmur heard  Pulmonary/Chest: Effort normal and breath sounds normal    Neurological: She is alert and oriented to person, place, and time  GCS eye subscore is 4  GCS verbal subscore is 5  GCS motor subscore is 6  Skin: Skin is warm and dry  Capillary refill takes less than 2 seconds  Rash noted  No lesion and no petechiae noted  There is erythema     Circular, erythematous patch with centrifugal spread with central clearing and advancing somewhat raised border characteristic with tinea corporis located within lower abdominal skin folds and bilateral inner thighs      Vitals reviewed  Vital Signs  ED Triage Vitals [03/07/20 0434]   Temperature Pulse Respirations Blood Pressure SpO2   97 8 °F (36 6 °C) 86 16 121/74 97 %      Temp Source Heart Rate Source Patient Position - Orthostatic VS BP Location FiO2 (%)   Oral Monitor Sitting Right arm --      Pain Score       No Pain           Vitals:    03/07/20 0434   BP: 121/74   Pulse: 86   Patient Position - Orthostatic VS: Sitting         Visual Acuity      ED Medications  Medications - No data to display    Diagnostic Studies  Results Reviewed     None                 No orders to display              Procedures  Procedures         ED Course                               MDM  Number of Diagnoses or Management Options  Rash:   Tinea corporis:   Diagnosis management comments: 73-year-old female with past medical history significant for tinea corporis who presents for complaint of rash x2 days, described as exactly similar to previous episodes of tinea corporis  On exam, well-appearing female, no acute distress, nontoxic appearance, vital signs stable, afebrile, circular, erythematous patch with centrifugal spread with central clearing and advancing somewhat raised border characteristic with tinea corporis located within lower abdominal skin folds and bilateral inner thighs, mildly tender to palpation, no other skin lesions  Rash consistent with recurrent tinea corporis  As patient has been treated with topical antifungal and shorter duration oral antifungal in the past, will moved to Diflucan once weekly for 4 weeks in attempt to definitively eradicate fungal infection  Gave referral for PCP follow-up in the event that symptoms do not resolve  Discussed other supportive care for rash including keeping area dry, cornstarch, nontoxic powder, body hygiene  See ED course note for dispo and plan  I discussed emergency department return parameters       I answered any and all questions the patient had regarding emergency department course of evaluation and treatment  The patient verbalized understanding of and agreement with plan  Amount and/or Complexity of Data Reviewed  Decide to obtain previous medical records or to obtain history from someone other than the patient: yes  Obtain history from someone other than the patient: yes  Review and summarize past medical records: yes  Discuss the patient with other providers: yes    Patient Progress  Patient progress: stable        Disposition  Final diagnoses:   Tinea corporis   Rash     Time reflects when diagnosis was documented in both MDM as applicable and the Disposition within this note     Time User Action Codes Description Comment    3/7/2020  4:46 AM Vanessa Concepcion Add [B35 4] Tinea corporis     3/7/2020  4:47 AM Porfirio, [address] Rash       ED Disposition     ED Disposition Condition Date/Time Comment    Discharge Stable Sat Mar 7, 2020  4:46 AM Katie Oates discharge to home/self care  Follow-up Information     Follow up With Specialties Details Why Contact Info    Jerome Toledo DO Family Medicine Schedule an appointment as soon as possible for a visit  If symptoms worsen or fail to improve [facility]  [address]   [phone]            Discharge Medication List as of 3/7/2020  4:48 AM      START taking these medications    Details   fluconazole (DIFLUCAN) 200 mg tablet Take 1 tablet (200 mg total) by mouth every 7 days for 4 doses, Starting Sat 3/7/2020, Until Sun 3/29/2020, Print         CONTINUE these medications which have NOT CHANGED    Details   clotrimazole (LOTRIMIN) 1 % cream Apply 1 g (1 application total) topically 2 (two) times a day Until rash resolves  Continue application for 1 week after the rash resolves, Starting Mon 4/29/2019, Print      ketoconazole (NIZORAL) 2 % cream Apply topically daily, Starting Mon 2/17/2020, Print           No discharge procedures on file      PDMP Review     None          ED Provider  Electronically Signed by           Rashad Paz PA-C  03/07/20 0175

## 2021-12-28 ENCOUNTER — HOSPITAL ENCOUNTER (EMERGENCY)
Facility: HOSPITAL | Age: 21
Discharge: LEFT BEFORE DISCHARGE | End: 2021-12-28

## 2021-12-28 VITALS
RESPIRATION RATE: 19 BRPM | DIASTOLIC BLOOD PRESSURE: 67 MMHG | SYSTOLIC BLOOD PRESSURE: 149 MMHG | HEART RATE: 100 BPM | OXYGEN SATURATION: 96 % | TEMPERATURE: 98 F

## 2021-12-28 LAB
FLUAV RNA RESP QL NAA+PROBE: NEGATIVE
FLUBV RNA RESP QL NAA+PROBE: NEGATIVE
RSV RNA RESP QL NAA+PROBE: NEGATIVE
SARS-COV-2 RNA RESP QL NAA+PROBE: POSITIVE

## 2021-12-28 PROCEDURE — 0241U HB NFCT DS VIR RESP RNA 4 TRGT: CPT

## 2021-12-28 NOTE — Clinical Note
Idelia Severe was seen and treated in our emergency department on 12/28/2021  Diagnosis:     Sunrise  may return to work on return date  She may return on this date:     Pt has been symptomatic from [facility] since Sat  Has been tested and results are pending  Must remained quarantined until negative result and fever free for 48 hours  If you have any questions or concerns, please don't hesitate to call        Marian Hairston RN    ______________________________           _______________          _______________  Hospital Representative                              Date                                Time

## 2021-12-28 NOTE — Clinical Note
Robert Miller was seen and treated in our emergency department on 12/28/2021  Diagnosis: Partha Fuentes  may return to work on return date  She may return on this date: 01/04/2022         If you have any questions or concerns, please don't hesitate to call        Beth Rojas PA-C    ______________________________           _______________          _______________  Hospital Representative                              Date                                Time

## 2021-12-28 NOTE — Clinical Note
Ariadne Yen was seen and treated in our emergency department on 12/28/2021  Diagnosis: Campos Ardon  may return to work on return date  She may return on this date: 01/04/2022         If you have any questions or concerns, please don't hesitate to call        Nadira Ferraro PA-C    ______________________________           _______________          _______________  Hospital Representative                              Date                                Time

## 2022-03-26 ENCOUNTER — HOSPITAL ENCOUNTER (EMERGENCY)
Facility: HOSPITAL | Age: 22
Discharge: HOME | End: 2022-03-26
Attending: EMERGENCY MEDICINE

## 2022-03-26 VITALS
HEART RATE: 96 BPM | BODY MASS INDEX: 45.99 KG/M2 | HEIGHT: 67 IN | RESPIRATION RATE: 17 BRPM | DIASTOLIC BLOOD PRESSURE: 92 MMHG | TEMPERATURE: 98.3 F | WEIGHT: 293 LBS | OXYGEN SATURATION: 98 % | SYSTOLIC BLOOD PRESSURE: 152 MMHG

## 2022-03-26 DIAGNOSIS — H66.90 ACUTE OTITIS MEDIA: Primary | ICD-10-CM

## 2022-03-26 PROCEDURE — 99283 EMERGENCY DEPT VISIT LOW MDM: CPT

## 2022-03-26 PROCEDURE — 99284 EMERGENCY DEPT VISIT MOD MDM: CPT | Performed by: PHYSICIAN ASSISTANT

## 2022-03-26 RX ORDER — AMOXICILLIN AND CLAVULANATE POTASSIUM 875; 125 MG/1; MG/1
1 TABLET, FILM COATED ORAL EVERY 12 HOURS
Qty: 14 TABLET | Refills: 0 | Status: SHIPPED | OUTPATIENT
Start: 2022-03-26 | End: 2022-04-02

## 2022-03-26 RX ORDER — AMOXICILLIN AND CLAVULANATE POTASSIUM 875; 125 MG/1; MG/1
1 TABLET, FILM COATED ORAL ONCE
Status: COMPLETED | OUTPATIENT
Start: 2022-03-26 | End: 2022-03-26

## 2022-03-26 RX ADMIN — AMOXICILLIN AND CLAVULANATE POTASSIUM 1 TABLET: 875; 125 TABLET, FILM COATED ORAL at 22:09

## 2022-03-27 NOTE — DISCHARGE INSTRUCTIONS
Take antibiotics as prescribed  Take Tylenol and ibuprofen as needed for pain  Please follow-up with your family doctor  Return to the ER with any worsening symptoms

## 2022-03-27 NOTE — ED PROVIDER NOTES
History  Chief Complaint   Patient presents with    Flu Symptoms     Pt reports clogged right ear and cold symptoms  Children recently sick     19yo female with history of bipolar disorder and ADHD presenting for evaluation of right ear pain x1 day  Patient has been having nasal congestion and mild URI symptoms over the past several days  These symptoms are gradually improving  She started to develop right ear pain and fullness today and decided to come to the ED  No fevers or chills  No neck stiffness, headache, ear drainage  History provided by:  Patient   used: No    Earache  Location:  Right  Behind ear:  No abnormality  Quality:  Pressure  Severity:  Moderate  Onset quality:  Gradual  Timing:  Constant  Progression:  Unchanged  Chronicity:  New  Relieved by:  Nothing  Worsened by:  Nothing  Associated symptoms: congestion    Associated symptoms: no abdominal pain, no cough, no diarrhea, no ear discharge, no fever, no neck pain, no rash, no sore throat and no vomiting        Prior to Admission Medications   Prescriptions Last Dose Informant Patient Reported? Taking? clotrimazole (LOTRIMIN) 1 % cream   No No   Sig: Apply 1 g (1 application total) topically 2 (two) times a day Until rash resolves   Continue application for 1 week after the rash resolves   ketoconazole (NIZORAL) 2 % cream   No No   Sig: Apply topically daily      Facility-Administered Medications: None       Past Medical History:   Diagnosis Date    ADHD     Anxiety     Bipolar 1 disorder (HCC)     Chemical pneumonitis (HCC)     Depression     Gastritis     PTSD (post-traumatic stress disorder)        Past Surgical History:   Procedure Laterality Date    WISDOM TOOTH EXTRACTION         Family History   Problem Relation Age of Onset    Stroke Mother     Heart disease Mother     Bipolar disorder Father     Heart disease Sister     Ovarian cysts Sister      I have reviewed and agree with the history as documented  E-Cigarette/Vaping     E-Cigarette/Vaping Substances     Social History     Tobacco Use    Smoking status: Current Every Day Smoker     Packs/day: 0 50     Types: Cigarettes    Smokeless tobacco: Never Used   Substance Use Topics    Alcohol use: Yes     Comment: social    Drug use: Yes     Types: Marijuana       Review of Systems   Constitutional: Negative for chills and fever  HENT: Positive for congestion and ear pain  Negative for ear discharge and sore throat  Eyes: Negative for discharge and redness  Respiratory: Negative for cough  Gastrointestinal: Negative for abdominal pain, diarrhea and vomiting  Musculoskeletal: Negative for neck pain and neck stiffness  Skin: Negative for color change and rash  Psychiatric/Behavioral: Negative for confusion  The patient is not nervous/anxious  All other systems reviewed and are negative  Physical Exam  Physical Exam  Vitals and nursing note reviewed  Constitutional:       General: She is not in acute distress  Appearance: She is well-developed  She is not diaphoretic  HENT:      Head: Normocephalic and atraumatic  Right Ear: External ear normal  No drainage, swelling or tenderness  No mastoid tenderness  Tympanic membrane is injected and erythematous  Left Ear: Tympanic membrane, ear canal and external ear normal       Nose: Nose normal       Mouth/Throat:      Mouth: Mucous membranes are moist       Pharynx: Oropharynx is clear  No oropharyngeal exudate  Eyes:      General: No scleral icterus  Right eye: No discharge  Left eye: No discharge  Conjunctiva/sclera: Conjunctivae normal    Cardiovascular:      Rate and Rhythm: Normal rate and regular rhythm  Heart sounds: Normal heart sounds  No murmur heard  Pulmonary:      Effort: Pulmonary effort is normal  No respiratory distress  Breath sounds: Normal breath sounds  No stridor  No wheezing or rales     Musculoskeletal: General: No deformity  Normal range of motion  Cervical back: Normal range of motion and neck supple  Lymphadenopathy:      Cervical: No cervical adenopathy  Skin:     General: Skin is warm and dry  Neurological:      Mental Status: She is alert  She is not disoriented  GCS: GCS eye subscore is 4  GCS verbal subscore is 5  GCS motor subscore is 6  Psychiatric:         Behavior: Behavior normal          Vital Signs  ED Triage Vitals [03/26/22 2144]   Temperature Pulse Respirations Blood Pressure SpO2   98 3 °F (36 8 °C) 96 17 152/92 98 %      Temp Source Heart Rate Source Patient Position - Orthostatic VS BP Location FiO2 (%)   Oral Monitor Sitting Left arm --      Pain Score       --           Vitals:    03/26/22 2144   BP: 152/92   Pulse: 96   Patient Position - Orthostatic VS: Sitting         Visual Acuity      ED Medications  Medications   amoxicillin-clavulanate (AUGMENTIN) 875-125 mg per tablet 1 tablet (1 tablet Oral Given 3/26/22 2209)       Diagnostic Studies  Results Reviewed     None                 No orders to display              Procedures  Procedures         ED Course                     MDM  Number of Diagnoses or Management Options  Acute otitis media: new and does not require workup  Diagnosis management comments: 21yo female presenting for right ear pain x1 day  Patient currently recovering from a URI  Patient is afebrile and hemodynamically stable  On exam, the right TM is erythematous and bulging consistent with AOM  No signs of otitis externa or mastoiditis  No indication for workup at this time  Will start on a course of Augmentin  Advised PRN Tylenol and ibuprofen for pain  Advised close PCP follow-up  ED return precautions discussed including signs of mastoiditis  Patient expressed understanding and is agreeable to plan  Patient discharged in stable condition        Risk of Complications, Morbidity, and/or Mortality  Presenting problems: low  Diagnostic procedures: low  Management options: low    Patient Progress  Patient progress: stable      Disposition  Final diagnoses:   Acute otitis media     Time reflects when diagnosis was documented in both MDM as applicable and the Disposition within this note     Time User Action Codes Description Comment    3/26/2022 10:04 PM [address] [place] [address] Pkwy, [place] Add [H66 90] Acute otitis media       ED Disposition     ED Disposition Condition Date/Time Comment    Discharge Stable Sat Mar 26, 2022 10:04 PM Deathae Jasson discharge to home/self care  Follow-up Information     Follow up With Specialties Details Why Contact Info Additional Information    your family doctor         [address] [place] [address] Emergency Department Emergency Medicine  If symptoms worsen [address] Emergency Department, [address] [place] [address], [facility]          Patient's Medications   Discharge Prescriptions    AMOXICILLIN-CLAVULANATE (AUGMENTIN) 875-125 MG PER TABLET    Take 1 tablet by mouth every 12 (twelve) hours for 7 days       Start Date: 3/26/2022 End Date: 4/2/2022       Order Dose: 1 tablet       Quantity: 14 tablet    Refills: 0       No discharge procedures on file      PDMP Review     None          ED Provider  Electronically Signed by           Subhash Garcia PA-C  03/26/22 0674

## 2022-04-09 ENCOUNTER — HOSPITAL ENCOUNTER (EMERGENCY)
Facility: HOSPITAL | Age: 22
Discharge: HOME | End: 2022-04-10
Attending: EMERGENCY MEDICINE | Admitting: EMERGENCY MEDICINE

## 2022-04-09 DIAGNOSIS — R11.2 NAUSEA AND VOMITING, UNSPECIFIED VOMITING TYPE: Primary | ICD-10-CM

## 2022-04-09 PROCEDURE — 99283 EMERGENCY DEPT VISIT LOW MDM: CPT

## 2022-04-10 VITALS
SYSTOLIC BLOOD PRESSURE: 116 MMHG | RESPIRATION RATE: 18 BRPM | HEART RATE: 78 BPM | DIASTOLIC BLOOD PRESSURE: 60 MMHG | TEMPERATURE: 98.2 F | OXYGEN SATURATION: 94 %

## 2022-04-10 LAB
FLUAV RNA RESP QL NAA+PROBE: NEGATIVE
FLUBV RNA RESP QL NAA+PROBE: NEGATIVE
RSV RNA RESP QL NAA+PROBE: NEGATIVE
SARS-COV-2 RNA RESP QL NAA+PROBE: NEGATIVE

## 2022-04-10 PROCEDURE — 0241U HB NFCT DS VIR RESP RNA 4 TRGT: CPT | Performed by: PHYSICIAN ASSISTANT

## 2022-04-10 PROCEDURE — 99284 EMERGENCY DEPT VISIT MOD MDM: CPT | Performed by: PHYSICIAN ASSISTANT

## 2022-04-10 RX ORDER — ONDANSETRON 4 MG/1
4 TABLET, ORALLY DISINTEGRATING ORAL ONCE
Status: COMPLETED | OUTPATIENT
Start: 2022-04-10 | End: 2022-04-10

## 2022-04-10 RX ORDER — ONDANSETRON 4 MG/1
4 TABLET, ORALLY DISINTEGRATING ORAL EVERY 6 HOURS PRN
Qty: 20 TABLET | Refills: 0 | Status: SHIPPED | OUTPATIENT
Start: 2022-04-10 | End: 2022-04-24

## 2022-04-10 RX ADMIN — ONDANSETRON 4 MG: 4 TABLET, ORALLY DISINTEGRATING ORAL at 00:20

## 2022-04-10 NOTE — ED PROVIDER NOTES
History  Chief Complaint   Patient presents with    Nausea     c/o "feeling nauseaous for couple of days, vomiting and very tired- feeling off"     Patient is a 49-year-old female with a past medical history significant for bipolar disorder, PTSD, gastritis presenting to the emergency department for evaluation of nausea and vomiting for the last 2 days  She states that she is fatigued and does not feel well  She is not having any associated pain  She is denying any fevers or chills  She has been able to tolerate p o  She is not having any chest pain, difficulty breathing, headache, dizziness, weakness  No other complaints at this time  Prior to Admission Medications   Prescriptions Last Dose Informant Patient Reported? Taking? clotrimazole (LOTRIMIN) 1 % cream   No No   Sig: Apply 1 g (1 application total) topically 2 (two) times a day Until rash resolves  Continue application for 1 week after the rash resolves   ketoconazole (NIZORAL) 2 % cream   No No   Sig: Apply topically daily      Facility-Administered Medications: None       Past Medical History:   Diagnosis Date    ADHD     Anxiety     Bipolar 1 disorder (HCC)     Chemical pneumonitis (HCC)     Depression     Gastritis     PTSD (post-traumatic stress disorder)        Past Surgical History:   Procedure Laterality Date    WISDOM TOOTH EXTRACTION         Family History   Problem Relation Age of Onset    Stroke Mother     Heart disease Mother     Bipolar disorder Father     Heart disease Sister     Ovarian cysts Sister      I have reviewed and agree with the history as documented      E-Cigarette/Vaping     E-Cigarette/Vaping Substances     Social History     Tobacco Use    Smoking status: Current Every Day Smoker     Packs/day: 0 50     Types: Cigarettes    Smokeless tobacco: Never Used   Substance Use Topics    Alcohol use: Yes     Comment: social    Drug use: Yes     Types: Marijuana       Review of Systems   Constitutional: Positive for fatigue  Negative for chills, diaphoresis and fever  HENT: Negative for congestion, facial swelling, nosebleeds, sore throat and voice change  Eyes: Negative for pain and redness  Respiratory: Negative for cough, choking, chest tightness, shortness of breath and stridor  Cardiovascular: Negative for chest pain and palpitations  Gastrointestinal: Positive for nausea and vomiting  Negative for abdominal pain and diarrhea  Genitourinary: Negative for difficulty urinating, dysuria, flank pain, hematuria, vaginal bleeding and vaginal discharge  Musculoskeletal: Negative for arthralgias, back pain, myalgias, neck pain and neck stiffness  Skin: Negative for color change and rash  Neurological: Negative for dizziness, syncope, facial asymmetry, weakness, light-headedness, numbness and headaches  Psychiatric/Behavioral: Negative for confusion and suicidal ideas  All other systems reviewed and are negative  Physical Exam  Physical Exam  Vitals reviewed  Constitutional:       General: She is not in acute distress  Appearance: Normal appearance  She is obese  She is not ill-appearing, toxic-appearing or diaphoretic  HENT:      Head: Normocephalic and atraumatic  Right Ear: External ear normal       Left Ear: External ear normal       Nose: Nose normal  No congestion or rhinorrhea  Mouth/Throat:      Mouth: Mucous membranes are moist       Pharynx: Oropharynx is clear  No oropharyngeal exudate or posterior oropharyngeal erythema  Eyes:      General: No scleral icterus  Right eye: No discharge  Left eye: No discharge  Extraocular Movements: Extraocular movements intact  Conjunctiva/sclera: Conjunctivae normal       Pupils: Pupils are equal, round, and reactive to light  Cardiovascular:      Rate and Rhythm: Normal rate and regular rhythm  Pulses: Normal pulses  Heart sounds: Normal heart sounds  No murmur heard  No friction rub   No gallop  Pulmonary:      Effort: Pulmonary effort is normal  No respiratory distress  Breath sounds: Normal breath sounds  No stridor  No wheezing, rhonchi or rales  Abdominal:      General: Abdomen is flat  Palpations: Abdomen is soft  Tenderness: There is no abdominal tenderness  There is no guarding or rebound  Musculoskeletal:      Cervical back: Normal range of motion and neck supple  Right lower leg: No edema  Left lower leg: No edema  Skin:     General: Skin is warm and dry  Capillary Refill: Capillary refill takes less than 2 seconds  Neurological:      General: No focal deficit present  Mental Status: She is alert and oriented to person, place, and time  Psychiatric:         Mood and Affect: Mood normal          Behavior: Behavior normal          Vital Signs  ED Triage Vitals [04/09/22 2332]   Temperature Pulse Respirations Blood Pressure SpO2   98 2 °F (36 8 °C) (!) 114 16 (!) 173/103 97 %      Temp Source Heart Rate Source Patient Position - Orthostatic VS BP Location FiO2 (%)   Oral Monitor Sitting Left arm --      Pain Score       --           Vitals:    04/10/22 0004 04/10/22 0030 04/10/22 0100 04/10/22 0130   BP: 133/84 131/63 118/74 116/60   Pulse: 87 94 79 78   Patient Position - Orthostatic VS:             Visual Acuity      ED Medications  Medications   ondansetron (ZOFRAN-ODT) dispersible tablet 4 mg (4 mg Oral Given 4/10/22 0020)       Diagnostic Studies  Results Reviewed     Procedure Component Value Units Date/Time    COVID/FLU/RSV [[id number]]  (Normal) Collected: 04/10/22 0021    Lab Status: Final result Specimen: Nares from Nose Updated: 04/10/22 0103     SARS-CoV-2 Negative     INFLUENZA A PCR Negative     INFLUENZA B PCR Negative     RSV PCR Negative    Narrative:      FOR PEDIATRIC PATIENTS - copy/paste COVID Guidelines URL to browser: [email or web address]    SARS-CoV-2 assay is a Nucleic Acid Amplification assay intended for the  qualitative detection of nucleic acid from SARS-CoV-2 in nasopharyngeal  swabs  Results are for the presumptive identification of SARS-CoV-2 RNA  Positive results are indicative of infection with SARS-CoV-2, the virus  causing COVID-19, but do not rule out bacterial infection or co-infection  with other viruses  Laboratories within the [place] and its  territories are required to report all positive results to the appropriate  public health authorities  Negative results do not preclude SARS-CoV-2  infection and should not be used as the sole basis for treatment or other  patient management decisions  Negative results must be combined with  clinical observations, patient history, and epidemiological information  This test has not been FDA cleared or approved  This test has been authorized by FDA under an Emergency Use Authorization  (EUA)  This test is only authorized for the duration of time the  declaration that circumstances exist justifying the authorization of the  emergency use of an in vitro diagnostic tests for detection of SARS-CoV-2  virus and/or diagnosis of COVID-19 infection under section 564(b)(1) of  the Act, 21 U  S C  837NBD-0(M)(1), unless the authorization is terminated  or revoked sooner  The test has been validated but independent review by FDA  and CLIA is pending  Test performed using [employer] Gene[employer]pert: This RT-PCR assay targets N2,  a region unique to SARS-CoV-2  A conserved region in the E-gene was chosen  for pan-Sarbecovirus detection which includes SARS-CoV-2  No orders to display              Procedures  Procedures         ED Course                                             MDM  Number of Diagnoses or Management Options  Nausea and vomiting, unspecified vomiting type  Diagnosis management comments: Patient presenting for evaluation of nausea and vomiting over the last 2 days  She appears comfortable    She is not any acute distress  She is mildly tachycardic upon arrival, her tachycardia resolved upon my assessment  She is not having any abdominal tenderness  She was given p o  Zofran and felt significantly improved  Patient was discharged home with a prescription for Zofran  Strict return precautions were discussed  Patient in stable condition at time of discharge  Patient Progress  Patient progress: stable      Disposition  Final diagnoses:   Nausea and vomiting, unspecified vomiting type     Time reflects when diagnosis was documented in both MDM as applicable and the Disposition within this note     Time User Action Codes Description Comment    4/10/2022  1:40 AM Roper Spear Add [R11 0] Nausea     4/10/2022  1:40 AM Roper Spear Add [R11 2] Nausea and vomiting, unspecified vomiting type     4/10/2022  1:40 AM Roper Spear Modify [R11 2] Nausea and vomiting, unspecified vomiting type     4/10/2022  1:40 AM Roper Spear Remove [R11 0] Nausea       ED Disposition     ED Disposition Condition Date/Time Comment    Discharge Stable Sun Apr 10, 2022  1:40 AM Emily Schmidt discharge to home/self care              Follow-up Information     Follow up With Specialties Details Why Contact Info Additional [address] [facility] Emergency Department Emergency Medicine Go to  If symptoms worsen [address] [facility]  [id number] [place] [address] Emergency Department, [address] [place] [address], [facility]          Discharge Medication List as of 4/10/2022  1:40 AM      START taking these medications    Details   ondansetron (Zofran ODT) 4 mg disintegrating tablet Take 1 tablet (4 mg total) by mouth every 6 (six) hours as needed for nausea or vomiting, Starting Sun 4/10/2022, Print         CONTINUE these medications which have NOT CHANGED    Details   clotrimazole (LOTRIMIN) 1 % cream Apply 1 g (1 application total) topically 2 (two) times a day Until rash resolves  Continue application for 1 week after the rash resolves, Starting Mon 4/29/2019, Print      ketoconazole (NIZORAL) 2 % cream Apply topically daily, Starting Mon 2/17/2020, Print             No discharge procedures on file      PDMP Review     None          ED Provider  Electronically Signed by           Thanh Valdez PA-C  04/11/22 3948

## 2022-04-24 ENCOUNTER — APPOINTMENT (EMERGENCY)
Dept: RADIOLOGY | Facility: HOSPITAL | Age: 22
End: 2022-04-24

## 2022-04-24 ENCOUNTER — HOSPITAL ENCOUNTER (EMERGENCY)
Facility: HOSPITAL | Age: 22
Discharge: HOME | End: 2022-04-24
Attending: EMERGENCY MEDICINE | Admitting: EMERGENCY MEDICINE

## 2022-04-24 VITALS
WEIGHT: 293 LBS | SYSTOLIC BLOOD PRESSURE: 134 MMHG | RESPIRATION RATE: 18 BRPM | TEMPERATURE: 98.8 F | HEART RATE: 95 BPM | DIASTOLIC BLOOD PRESSURE: 65 MMHG | OXYGEN SATURATION: 97 % | BODY MASS INDEX: 46.99 KG/M2

## 2022-04-24 DIAGNOSIS — S90.129A TOE CONTUSION: Primary | ICD-10-CM

## 2022-04-24 LAB
EXT PREG TEST URINE: NEGATIVE
EXT. CONTROL ED NAV: NORMAL

## 2022-04-24 PROCEDURE — 73630 X-RAY EXAM OF FOOT: CPT

## 2022-04-24 PROCEDURE — 99284 EMERGENCY DEPT VISIT MOD MDM: CPT | Performed by: EMERGENCY MEDICINE

## 2022-04-24 PROCEDURE — 99283 EMERGENCY DEPT VISIT LOW MDM: CPT

## 2022-04-24 PROCEDURE — 81025 URINE PREGNANCY TEST: CPT | Performed by: EMERGENCY MEDICINE

## 2022-05-01 NOTE — ED PROVIDER NOTES
History  Chief Complaint   Patient presents with    Toe Injury     R 4th toe inj yesterday; +bruising      57-year-old biological female identifying as male presents emergency department for evaluation of right toe injury  Patient states that he stubbed his left 4th toe on a dresser yesterday, noticed bruising and color change in aching  Has been able to bear weight  No other injury  No numbness weakness or tenderness  None       Past Medical History:   Diagnosis Date    ADHD     Anxiety     Bipolar 1 disorder (HCC)     Chemical pneumonitis (HCC)     Depression     Gastritis     PTSD (post-traumatic stress disorder)        Past Surgical History:   Procedure Laterality Date    WISDOM TOOTH EXTRACTION         Family History   Problem Relation Age of Onset    Stroke Mother     Heart disease Mother     Bipolar disorder Father     Heart disease Sister     Ovarian cysts Sister      I have reviewed and agree with the history as documented  E-Cigarette/Vaping     E-Cigarette/Vaping Substances     Social History     Tobacco Use    Smoking status: Current Every Day Smoker     Packs/day: 0 50     Types: Cigarettes    Smokeless tobacco: Never Used   Substance Use Topics    Alcohol use: Yes     Comment: social    Drug use: Yes     Types: Marijuana       Review of Systems   Constitutional: Negative for appetite change, chills, fatigue and fever  HENT: Negative for sneezing and sore throat  Eyes: Negative for visual disturbance  Respiratory: Negative for cough, choking, chest tightness, shortness of breath and wheezing  Cardiovascular: Negative for chest pain and palpitations  Gastrointestinal: Negative for abdominal pain, constipation, diarrhea, nausea and vomiting  Genitourinary: Negative for difficulty urinating and dysuria  Musculoskeletal: Positive for arthralgias  Neurological: Negative for dizziness, weakness, light-headedness, numbness and headaches     All other systems reviewed and are negative  Physical Exam  Physical Exam  Vitals and nursing note reviewed  Constitutional:       General: He is not in acute distress  Appearance: He is well-developed  He is not diaphoretic  HENT:      Head: Normocephalic and atraumatic  Eyes:      Pupils: Pupils are equal, round, and reactive to light  Neck:      Vascular: No JVD  Trachea: No tracheal deviation  Cardiovascular:      Rate and Rhythm: Normal rate and regular rhythm  Heart sounds: Normal heart sounds  No murmur heard  No friction rub  No gallop  Pulmonary:      Effort: Pulmonary effort is normal  No respiratory distress  Breath sounds: Normal breath sounds  No wheezing or rales  Abdominal:      General: Bowel sounds are normal  There is no distension  Palpations: Abdomen is soft  Tenderness: There is no abdominal tenderness  There is no guarding or rebound  Skin:     General: Skin is warm and dry  Coloration: Skin is not pale  Neurological:      Mental Status: He is alert and oriented to person, place, and time  Cranial Nerves: No cranial nerve deficit  Motor: No abnormal muscle tone     Psychiatric:         Behavior: Behavior normal          Vital Signs  ED Triage Vitals [04/24/22 2010]   Temperature Pulse Respirations Blood Pressure SpO2   98 8 °F (37 1 °C) 95 18 134/65 97 %      Temp Source Heart Rate Source Patient Position - Orthostatic VS BP Location FiO2 (%)   Oral Monitor Sitting Left arm --      Pain Score       --           Vitals:    04/24/22 2010   BP: 134/65   Pulse: 95   Patient Position - Orthostatic VS: Sitting         Visual Acuity      ED Medications  Medications - No data to display    Diagnostic Studies  Results Reviewed     Procedure Component Value Units Date/Time    POCT pregnancy, urine [[id number]]  (Normal) Resulted: 04/24/22 2032    Lab Status: Final result Updated: 04/24/22 2032     EXT PREG TEST UR (Ref: Negative) Negative     Control Valid XR foot 3+ views RIGHT   Final Result by Bry Fregoso MD (04/25 9320)   Hallux valgus deformity      No acute osseous abnormality  Workstation performed: [id number]                    Procedures  Procedures         ED Course                               SBIRT 20yo+      Most Recent Value   SBIRT (22 yo +)    In order to provide better care to our patients, we are screening all of our patients for alcohol and drug use  Would it be okay to ask you these screening questions? Yes Filed at: 04/24/2022 2011   Initial Alcohol Screen: US AUDIT-C     1  How often do you have a drink containing alcohol? 2 Filed at: 04/24/2022 2011   2  How many drinks containing alcohol do you have on a typical day you are drinking? 1 Filed at: 04/24/2022 2011   3a  Male UNDER 65: How often do you have five or more drinks on one occasion? 0 Filed at: 04/24/2022 2011   3b  FEMALE Any Age, or MALE 65+: How often do you have 4 or more drinks on one occassion? 0 Filed at: 04/24/2022 2011   Audit-C Score 3 Filed at: 04/24/2022 2011   BOGDAN: How many times in the past year have you    Used an illegal drug or used a prescription medication for non-medical reasons? Never Filed at: 04/24/2022 2011                    [facility]  Number of Diagnoses or Management Options  Toe contusion  Diagnosis management comments: 57-year-old biological female transitioning to male with contusion of the right 4th toe, no external signs of trauma on exam, check x-ray, recommend rest ice and said, reassess  Disposition  Final diagnoses: Toe contusion     Time reflects when diagnosis was documented in both MDM as applicable and the Disposition within this note     Time User Action Codes Description Comment    4/24/2022  9:02 PM Buel Cassette Add [W76 115L] Toe contusion       ED Disposition     ED Disposition Condition Date/Time Comment    Discharge Stable Sun Apr 24, 2022  9:02 PM Chelsi Nones discharge to home/self care  Follow-up Information    None         There are no discharge medications for this patient  No discharge procedures on file      PDMP Review     None          ED Provider  Electronically Signed by           Hernandez Rocha MD  04/30/22 3177

## 2022-05-26 ENCOUNTER — HOSPITAL ENCOUNTER (EMERGENCY)
Facility: HOSPITAL | Age: 22
Discharge: HOME | End: 2022-05-26
Attending: EMERGENCY MEDICINE

## 2022-05-26 ENCOUNTER — APPOINTMENT (EMERGENCY)
Dept: CT IMAGING | Facility: HOSPITAL | Age: 22
End: 2022-05-26

## 2022-05-26 DIAGNOSIS — R19.7 NAUSEA VOMITING AND DIARRHEA: Primary | ICD-10-CM

## 2022-05-26 DIAGNOSIS — R11.2 NAUSEA VOMITING AND DIARRHEA: Primary | ICD-10-CM

## 2022-05-26 LAB
ALBUMIN SERPL BCP-MCNC: 3.6 G/DL (ref 3.5–5)
ALP SERPL-CCNC: 69 U/L (ref 46–116)
ALT SERPL W P-5'-P-CCNC: 35 U/L (ref 12–78)
ANION GAP SERPL CALCULATED.3IONS-SCNC: 7 MMOL/L (ref 4–13)
AST SERPL W P-5'-P-CCNC: 23 U/L (ref 5–45)
BACTERIA UR QL AUTO: ABNORMAL /HPF
BASOPHILS # BLD AUTO: 0.02 THOUSANDS/ΜL (ref 0–0.1)
BASOPHILS NFR BLD AUTO: 0 % (ref 0–1)
BILIRUB SERPL-MCNC: 0.23 MG/DL (ref 0.2–1)
BILIRUB UR QL STRIP: ABNORMAL
BUN SERPL-MCNC: 14 MG/DL (ref 5–25)
CALCIUM SERPL-MCNC: 8.4 MG/DL (ref 8.3–10.1)
CHLORIDE SERPL-SCNC: 102 MMOL/L (ref 100–108)
CLARITY UR: CLEAR
CO2 SERPL-SCNC: 26 MMOL/L (ref 21–32)
COLOR UR: YELLOW
CREAT SERPL-MCNC: 0.83 MG/DL (ref 0.6–1.3)
EOSINOPHIL # BLD AUTO: 0.04 THOUSAND/ΜL (ref 0–0.61)
EOSINOPHIL NFR BLD AUTO: 1 % (ref 0–6)
ERYTHROCYTE [DISTWIDTH] IN BLOOD BY AUTOMATED COUNT: 14.6 % (ref 11.6–15.1)
EXT PREG TEST URINE: NEGATIVE
EXT. CONTROL ED NAV: NORMAL
GLUCOSE SERPL-MCNC: 116 MG/DL (ref 65–140)
GLUCOSE UR STRIP-MCNC: NEGATIVE MG/DL
HCT VFR BLD AUTO: 48.8 % (ref 36.5–46.1)
HGB BLD-MCNC: 15.7 G/DL (ref 12–15.4)
HGB UR QL STRIP.AUTO: NEGATIVE
IMM GRANULOCYTES # BLD AUTO: 0.02 THOUSAND/UL (ref 0–0.2)
IMM GRANULOCYTES NFR BLD AUTO: 0 % (ref 0–2)
KETONES UR STRIP-MCNC: NEGATIVE MG/DL
LEUKOCYTE ESTERASE UR QL STRIP: NEGATIVE
LIPASE SERPL-CCNC: 70 U/L (ref 73–393)
LYMPHOCYTES # BLD AUTO: 1.74 THOUSANDS/ΜL (ref 0.6–4.47)
LYMPHOCYTES NFR BLD AUTO: 21 % (ref 14–44)
MCH RBC QN AUTO: 29.2 PG (ref 26.8–34.3)
MCHC RBC AUTO-ENTMCNC: 32.2 G/DL (ref 31.4–37.4)
MCV RBC AUTO: 91 FL (ref 82–98)
MONOCYTES # BLD AUTO: 0.74 THOUSAND/ΜL (ref 0.17–1.22)
MONOCYTES NFR BLD AUTO: 9 % (ref 4–12)
MUCOUS THREADS UR QL AUTO: ABNORMAL
NEUTROPHILS # BLD AUTO: 5.82 THOUSANDS/ΜL (ref 1.85–7.62)
NEUTS SEG NFR BLD AUTO: 69 % (ref 43–75)
NITRITE UR QL STRIP: NEGATIVE
NON-SQ EPI CELLS URNS QL MICRO: ABNORMAL /HPF
NRBC BLD AUTO-RTO: 0 /100 WBCS
PH UR STRIP.AUTO: 6.5 [PH]
PLATELET # BLD AUTO: 269 THOUSANDS/UL (ref 149–390)
PMV BLD AUTO: 9 FL (ref 8.9–12.7)
POTASSIUM SERPL-SCNC: 3.3 MMOL/L (ref 3.5–5.3)
PROT SERPL-MCNC: 7.5 G/DL (ref 6.4–8.2)
PROT UR STRIP-MCNC: ABNORMAL MG/DL
RBC # BLD AUTO: 5.38 MILLION/UL (ref 3.88–5.12)
RBC #/AREA URNS AUTO: ABNORMAL /HPF
SODIUM SERPL-SCNC: 135 MMOL/L (ref 136–145)
SP GR UR STRIP.AUTO: >=1.03 (ref 1–1.03)
UROBILINOGEN UR QL STRIP.AUTO: 0.2 E.U./DL
WBC # BLD AUTO: 8.38 THOUSAND/UL (ref 4.31–10.16)
WBC #/AREA URNS AUTO: ABNORMAL /HPF

## 2022-05-26 PROCEDURE — 81001 URINALYSIS AUTO W/SCOPE: CPT | Performed by: PHYSICIAN ASSISTANT

## 2022-05-26 PROCEDURE — 80053 COMPREHEN METABOLIC PANEL: CPT | Performed by: EMERGENCY MEDICINE

## 2022-05-26 PROCEDURE — 85025 COMPLETE CBC W/AUTO DIFF WBC: CPT | Performed by: EMERGENCY MEDICINE

## 2022-05-26 PROCEDURE — 74176 CT ABD & PELVIS W/O CONTRAST: CPT

## 2022-05-26 PROCEDURE — 81025 URINE PREGNANCY TEST: CPT | Performed by: PHYSICIAN ASSISTANT

## 2022-05-26 PROCEDURE — 96361 HYDRATE IV INFUSION ADD-ON: CPT

## 2022-05-26 PROCEDURE — 96375 TX/PRO/DX INJ NEW DRUG ADDON: CPT

## 2022-05-26 PROCEDURE — 36415 COLL VENOUS BLD VENIPUNCTURE: CPT

## 2022-05-26 PROCEDURE — 99284 EMERGENCY DEPT VISIT MOD MDM: CPT

## 2022-05-26 PROCEDURE — 99284 EMERGENCY DEPT VISIT MOD MDM: CPT | Performed by: PHYSICIAN ASSISTANT

## 2022-05-26 PROCEDURE — 96374 THER/PROPH/DIAG INJ IV PUSH: CPT

## 2022-05-26 PROCEDURE — 83690 ASSAY OF LIPASE: CPT | Performed by: EMERGENCY MEDICINE

## 2022-05-26 RX ORDER — KETOROLAC TROMETHAMINE 30 MG/ML
15 INJECTION, SOLUTION INTRAMUSCULAR; INTRAVENOUS ONCE
Status: COMPLETED | OUTPATIENT
Start: 2022-05-26 | End: 2022-05-26

## 2022-05-26 RX ORDER — ONDANSETRON 2 MG/ML
4 INJECTION INTRAMUSCULAR; INTRAVENOUS ONCE
Status: COMPLETED | OUTPATIENT
Start: 2022-05-26 | End: 2022-05-26

## 2022-05-26 RX ORDER — ONDANSETRON 4 MG/1
4 TABLET, ORALLY DISINTEGRATING ORAL EVERY 8 HOURS PRN
Qty: 20 TABLET | Refills: 0 | Status: SHIPPED | OUTPATIENT
Start: 2022-05-26

## 2022-05-26 RX ADMIN — KETOROLAC TROMETHAMINE 15 MG: 30 INJECTION, SOLUTION INTRAMUSCULAR at 18:19

## 2022-05-26 RX ADMIN — ONDANSETRON 4 MG: 2 INJECTION INTRAMUSCULAR; INTRAVENOUS at 18:19

## 2022-05-26 RX ADMIN — SODIUM CHLORIDE 1000 ML: 0.9 INJECTION, SOLUTION INTRAVENOUS at 18:19

## 2022-05-26 NOTE — Clinical Note
Sarah Oh was seen and treated in our emergency department on 5/26/2022  Diagnosis:     Lorene Cherry  may return to work on return date  He may return on this date: 05/29/2022         If you have any questions or concerns, please don't hesitate to call        Inez Temple RN    ______________________________           _______________          _______________  Hospital Representative                              Date                                Time

## 2022-05-27 NOTE — DISCHARGE INSTRUCTIONS
Continue hydration and oral intake as tolerated  Take zofran as needed for alleviation of nausea  GI follow up as needed if symptoms persist   Return immediately to the ED with any new or worsening symptoms

## 2022-05-27 NOTE — ED PROVIDER NOTES
History  Chief Complaint   Patient presents with    Vomiting     Vomiting and diarrhea since Tuesday, able to keep food down today, generalized abdominal pain     Wayne Rodriguez is a 40-year-old transgender female to male arriving to the emergency department for evaluation with complaint of persistent nausea, vomiting, diarrhea, and abdominal pain  The patient relates his daughter was experiencing similar symptoms prior to onset in the patient with presumable sick contact at school  Denies ingestion of spoiled foods or suspicious food intake  Reports multiple episodes of nonbloody, nonbilious emesis, relating he has not been able to keep anything down today  Also reports multiple episodes of diarrhea denying any melena or bright red blood per rectum  The patient reports generalized abdominal pain finding that this is improved from time of symptom onset  The patient has no pertinent surgical history  Patient denies fevers, chills, sweats, back pain or flank pain  Denies decreased urination or any UTI symptoms  Patient offers no other complaints or concerns at this time  None       Past Medical History:   Diagnosis Date    ADHD     Anxiety     Bipolar 1 disorder (HCC)     Chemical pneumonitis (HCC)     Depression     Gastritis     PTSD (post-traumatic stress disorder)        Past Surgical History:   Procedure Laterality Date    WISDOM TOOTH EXTRACTION         Family History   Problem Relation Age of Onset    Stroke Mother     Heart disease Mother     Bipolar disorder Father     Heart disease Sister     Ovarian cysts Sister      I have reviewed and agree with the history as documented      E-Cigarette/Vaping     E-Cigarette/Vaping Substances     Social History     Tobacco Use    Smoking status: Current Every Day Smoker     Packs/day: 0 50     Types: Cigarettes    Smokeless tobacco: Never Used   Substance Use Topics    Alcohol use: Yes     Comment: social    Drug use: Yes     Types: Marijuana       Review of Systems   Constitutional: Negative for diaphoresis, fatigue and fever  Gastrointestinal: Positive for abdominal pain, diarrhea, nausea and vomiting  Genitourinary: Negative for difficulty urinating, dysuria, flank pain, frequency and urgency  Skin: Negative for pallor and rash  All other systems reviewed and are negative  Physical Exam  Physical Exam  Vitals and nursing note reviewed  Constitutional:       General: He is not in acute distress  Appearance: Normal appearance  He is well-developed  He is not ill-appearing, toxic-appearing or diaphoretic  HENT:      Head: Normocephalic and atraumatic  Right Ear: External ear normal       Left Ear: External ear normal    Eyes:      Conjunctiva/sclera: Conjunctivae normal    Cardiovascular:      Rate and Rhythm: Normal rate and regular rhythm  Pulses: Normal pulses  Pulmonary:      Effort: Pulmonary effort is normal  No respiratory distress  Breath sounds: Normal breath sounds  No wheezing, rhonchi or rales  Chest:      Chest wall: No tenderness  Abdominal:      General: There is no distension  Palpations: Abdomen is soft  Tenderness: There is abdominal tenderness  There is no guarding or rebound  Comments: Mild generalized tenderness upon palpation  No peritoneal signs  Musculoskeletal:         General: Normal range of motion  Cervical back: Normal range of motion and neck supple  Right lower leg: No edema  Left lower leg: No edema  Skin:     General: Skin is warm and dry  Capillary Refill: Capillary refill takes less than 2 seconds  Neurological:      Mental Status: He is alert  Motor: Motor function is intact  No weakness  Gait: Gait is intact     Psychiatric:         Mood and Affect: Mood normal          Vital Signs  ED Triage Vitals [05/26/22 1554]   Temperature Pulse Respirations Blood Pressure SpO2   98 6 °F (37 °C) 97 18 147/87 97 %      Temp Source Heart Rate Source Patient Position - Orthostatic VS BP Location FiO2 (%)   Oral Monitor Sitting Left arm --      Pain Score       --           Vitals:    05/26/22 1554   BP: 147/87   Pulse: 97   Patient Position - Orthostatic VS: Sitting         Visual Acuity      ED Medications  Medications   sodium chloride 0 9 % bolus 1,000 mL (1,000 mL Intravenous New Bag 5/26/22 1819)   ondansetron (ZOFRAN) injection 4 mg (4 mg Intravenous Given 5/26/22 1819)   ketorolac (TORADOL) injection 15 mg (15 mg Intravenous Given 5/26/22 1819)       Diagnostic Studies  Results Reviewed     Procedure Component Value Units Date/Time    Urine Microscopic [[id number]]  (Abnormal) Collected: 05/26/22 1937    Lab Status: Final result Specimen: Urine, Clean Catch Updated: 05/26/22 2005     RBC, UA None Seen /hpf      WBC, UA 2-4 /hpf      Epithelial Cells Occasional /hpf      Bacteria, UA None Seen /hpf      MUCUS THREADS Moderate    UA w Reflex to Microscopic w Reflex to Culture [[id number]]  (Abnormal) Collected: 05/26/22 1937    Lab Status: Final result Specimen: Urine, Clean Catch Updated: 05/26/22 1947     Color, UA Yellow     Clarity, UA Clear     Specific Gravity, UA >=1 030     pH, UA 6 5     Leukocytes, UA Negative     Nitrite, UA Negative     Protein,  (2+) mg/dl      Glucose, UA Negative mg/dl      Ketones, UA Negative mg/dl      Urobilinogen, UA 0 2 E U /dl      Bilirubin, UA Small     Blood, UA Negative    POCT pregnancy, urine [[id number]]  (Normal) Resulted: 05/26/22 1941    Lab Status: Final result Updated: 05/26/22 1941     EXT PREG TEST UR (Ref: Negative) negative     Control valid    Comprehensive metabolic panel [[id number]]  (Abnormal) Collected: 05/26/22 1559    Lab Status: Final result Specimen: Blood from Arm, Right Updated: 05/26/22 1625     Sodium 135 mmol/L      Potassium 3 3 mmol/L      Chloride 102 mmol/L      CO2 26 mmol/L      ANION GAP 7 mmol/L      BUN 14 mg/dL      Creatinine 0 83 mg/dL      Glucose 116 mg/dL      Calcium 8 4 mg/dL      AST 23 U/L      ALT 35 U/L      Alkaline Phosphatase 69 U/L      Total Protein 7 5 g/dL      Albumin 3 6 g/dL      Total Bilirubin 0 23 mg/dL      eGFR --    Narrative:      Notes:     1  eGFR calculation is only valid for adults 18 years and older  2  EGFR calculation cannot be performed for patients who are transgender, non-binary, or whose legal sex, sex at birth, and gender identity differ  Lipase [[id number]]  (Abnormal) Collected: 05/26/22 1559    Lab Status: Final result Specimen: Blood from Arm, Right Updated: 05/26/22 1625     Lipase 70 u/L     CBC and differential [[id number]]  (Abnormal) Collected: 05/26/22 1559    Lab Status: Final result Specimen: Blood from Arm, Right Updated: 05/26/22 1609     WBC 8 38 Thousand/uL      RBC 5 38 Million/uL      Hemoglobin 15 7 g/dL      Hematocrit 48 8 %      MCV 91 fL      MCH 29 2 pg      MCHC 32 2 g/dL      RDW 14 6 %      MPV 9 0 fL      Platelets 741 Thousands/uL      nRBC 0 /100 WBCs      Neutrophils Relative 69 %      Immat GRANS % 0 %      Lymphocytes Relative 21 %      Monocytes Relative 9 %      Eosinophils Relative 1 %      Basophils Relative 0 %      Neutrophils Absolute 5 82 Thousands/µL      Immature Grans Absolute 0 02 Thousand/uL      Lymphocytes Absolute 1 74 Thousands/µL      Monocytes Absolute 0 74 Thousand/µL      Eosinophils Absolute 0 04 Thousand/µL      Basophils Absolute 0 02 Thousands/µL                  CT abdomen pelvis wo contrast   Final Result by Carolyn Martínez MD (05/26 2023)      Normal appendix  No findings to explain patient's symptoms  Workstation performed: [id number]                    Procedures  Procedures         ED Course                                             MDM  Number of Diagnoses or Management Options  Nausea vomiting and diarrhea: new and requires workup  Diagnosis management comments:  This is a 68-year-old female to male transgender presenting to the emergency department for evaluation with complaint of persistent nausea, vomiting, and diarrhea  Patient states that their daughter was experiencing similar symptoms prior to onset  Was experiencing generalized abdominal pain earlier which had since improved  No pertinent surgical history  Differential diagnosis includes but is not limited to:  Gastroenteritis, enteritis, colitis, viral illness, ibs, labs to assess for evidence of dehydration, renal impairment, electrolyte derangements, anemia    Initial ED plan:  Labs, imaging, antiemetics/hydration and reassessment; offered COVID/flu/RSV swab which was declined by the patient    Final ED Assessment: Vital signs stable on ED presentation, examination as above  All labs and imaging independently reviewed with imaging interpreted by the Radiologist   Lab work demonstrates mild hypokalemia of 3 3  CT abdomen/pelvis reports normal appendix, and no acute findings  All testing results reviewed with the patient at bedside  Patient had reported alleviation of symptoms with treatments administered here in the emergency department and was able to tolerate oral intake without subsequent episodes of vomiting  Made aware of supportive care to include bland diet, advancing as tolerated, and discharge with scripts for Zofran to take as needed for alleviation of any recurrent nausea  Recommended the gastroenterology follow-up if symptoms persist   Parameters for ED return discussed at length including but not limited to intractable pain, nausea/vomiting/diarrhea, or any other worrisome symptoms  The patient had verbalized understanding and was comfortable and agreeable with disposition and care plan  Stable vital signs, unremarkable physical examination, resolution of symptoms following ED treatments, and negative workup today reassuring    The patient was discharged in stable condition and had ambulated independently from the emergency department today without issue  Amount and/or Complexity of Data Reviewed  Clinical lab tests: ordered and reviewed  Tests in the radiology section of CPT®: ordered and reviewed  Review and summarize past medical records: yes  Independent visualization of images, tracings, or specimens: yes    Risk of Complications, Morbidity, and/or Mortality  Presenting problems: moderate  Diagnostic procedures: moderate  Management options: low    Patient Progress  Patient progress: stable      Disposition  Final diagnoses:   Nausea vomiting and diarrhea     Time reflects when diagnosis was documented in both MDM as applicable and the Disposition within this note     Time User Action Codes Description Comment    5/26/2022  8:46 PM Kentrell Angela Add [R11 2,  R19 7] Nausea vomiting and diarrhea       ED Disposition     ED Disposition   Discharge    Condition   Stable    Date/Time   Thu May 26, 2022  8:46 PM    Comment   Wale Isaacs discharge to home/self care  Follow-up Information     Follow up With Specialties Details Why Contact Info Additional Information    [facility] [address] Gastroenterology   [address]  [address] [facility] [phone]  [address] [place] [address] [facility] [address], [address] [place] [address], [phone]     [address] [place] [address] Emergency Department Emergency Medicine  If symptoms worsen 34 [facility] 109 [facility] Emergency Department, [address] [place], [place], [address]          Discharge Medication List as of 5/26/2022  8:52 PM      START taking these medications    Details   ondansetron (Zofran ODT) 4 mg disintegrating tablet Take 1 tablet (4 mg total) by mouth every 8 (eight) hours as needed for nausea or vomiting, Starting Thu 5/26/2022, Normal             No discharge procedures on file      PDMP Review None          ED Provider  Electronically Signed by           Storm Ang PA-C  06/03/22 5826

## 2022-05-31 VITALS
WEIGHT: 293 LBS | BODY MASS INDEX: 46.96 KG/M2 | RESPIRATION RATE: 18 BRPM | OXYGEN SATURATION: 97 % | TEMPERATURE: 98.6 F | HEART RATE: 95 BPM | SYSTOLIC BLOOD PRESSURE: 147 MMHG | DIASTOLIC BLOOD PRESSURE: 87 MMHG

## 2022-06-08 ENCOUNTER — HOSPITAL ENCOUNTER (EMERGENCY)
Facility: HOSPITAL | Age: 22
Discharge: HOME | End: 2022-06-09
Attending: EMERGENCY MEDICINE

## 2022-06-08 VITALS
DIASTOLIC BLOOD PRESSURE: 95 MMHG | TEMPERATURE: 98.3 F | SYSTOLIC BLOOD PRESSURE: 146 MMHG | RESPIRATION RATE: 19 BRPM | HEART RATE: 92 BPM | OXYGEN SATURATION: 97 %

## 2022-06-08 DIAGNOSIS — S90.211A CONTUSION OF RIGHT GREAT TOE WITH DAMAGE TO NAIL, INITIAL ENCOUNTER: Primary | ICD-10-CM

## 2022-06-08 PROCEDURE — 99283 EMERGENCY DEPT VISIT LOW MDM: CPT

## 2022-06-09 ENCOUNTER — APPOINTMENT (EMERGENCY)
Dept: RADIOLOGY | Facility: HOSPITAL | Age: 22
End: 2022-06-09

## 2022-06-09 PROCEDURE — 73630 X-RAY EXAM OF FOOT: CPT

## 2022-06-09 PROCEDURE — 96372 THER/PROPH/DIAG INJ SC/IM: CPT

## 2022-06-09 PROCEDURE — 99284 EMERGENCY DEPT VISIT MOD MDM: CPT | Performed by: PHYSICIAN ASSISTANT

## 2022-06-09 RX ORDER — CEPHALEXIN 250 MG/1
500 CAPSULE ORAL ONCE
Status: COMPLETED | OUTPATIENT
Start: 2022-06-09 | End: 2022-06-09

## 2022-06-09 RX ORDER — CEPHALEXIN 500 MG/1
500 CAPSULE ORAL EVERY 6 HOURS SCHEDULED
Qty: 28 CAPSULE | Refills: 0 | Status: SHIPPED | OUTPATIENT
Start: 2022-06-09 | End: 2022-06-16

## 2022-06-09 RX ORDER — KETOROLAC TROMETHAMINE 30 MG/ML
15 INJECTION, SOLUTION INTRAMUSCULAR; INTRAVENOUS ONCE
Status: COMPLETED | OUTPATIENT
Start: 2022-06-09 | End: 2022-06-09

## 2022-06-09 RX ADMIN — KETOROLAC TROMETHAMINE 15 MG: 30 INJECTION, SOLUTION INTRAMUSCULAR at 00:32

## 2022-06-09 RX ADMIN — CEPHALEXIN 500 MG: 250 CAPSULE ORAL at 00:32

## 2022-06-09 NOTE — ED PROVIDER NOTES
History  Chief Complaint   Patient presents with    Toe Injury     Patient was lifting a couch and dropped it on his big right toe  Patient is a 23-year-old transgender male presenting to the emergency department for evaluation of an injury to his right great toe  Patient states that on Saturday he dropped a couch on his right great toe  He states that initially his toenail was bruised, however the blood drained out an Allis toenail is white  He is having pain to the right great toe  He has pain with ambulation and movement of the right great toe  He also reports some purulent drainage  No fevers or chills  No other complaints at this time  Prior to Admission Medications   Prescriptions Last Dose Informant Patient Reported? Taking?   ondansetron (Zofran ODT) 4 mg disintegrating tablet   No No   Sig: Take 1 tablet (4 mg total) by mouth every 8 (eight) hours as needed for nausea or vomiting      Facility-Administered Medications: None       Past Medical History:   Diagnosis Date    ADHD     Anxiety     Bipolar 1 disorder (HCC)     Chemical pneumonitis (HCC)     Depression     Gastritis     PTSD (post-traumatic stress disorder)        Past Surgical History:   Procedure Laterality Date    WISDOM TOOTH EXTRACTION         Family History   Problem Relation Age of Onset    Stroke Mother     Heart disease Mother     Bipolar disorder Father     Heart disease Sister     Ovarian cysts Sister      I have reviewed and agree with the history as documented      E-Cigarette/Vaping    E-Cigarette Use Never User      E-Cigarette/Vaping Substances     Social History     Tobacco Use    Smoking status: Current Every Day Smoker     Packs/day: 0 50     Types: Cigarettes    Smokeless tobacco: Never Used   Vaping Use    Vaping Use: Never used   Substance Use Topics    Alcohol use: Yes     Comment: social    Drug use: Yes     Types: Marijuana       Review of Systems   Constitutional: Negative for chills and fever  HENT: Negative for congestion and voice change  Respiratory: Negative for cough, chest tightness, shortness of breath and stridor  Cardiovascular: Negative for chest pain, palpitations and leg swelling  Gastrointestinal: Negative for abdominal pain, diarrhea, nausea and vomiting  Musculoskeletal: Positive for arthralgias and joint swelling  Skin: Positive for wound  All other systems reviewed and are negative  Physical Exam  Physical Exam  Vitals reviewed  Constitutional:       General: He is not in acute distress  Appearance: Normal appearance  He is obese  He is not ill-appearing, toxic-appearing or diaphoretic  HENT:      Head: Normocephalic and atraumatic  Right Ear: External ear normal       Left Ear: External ear normal    Eyes:      General: No scleral icterus  Right eye: No discharge  Left eye: No discharge  Extraocular Movements: Extraocular movements intact  Conjunctiva/sclera: Conjunctivae normal    Cardiovascular:      Rate and Rhythm: Normal rate and regular rhythm  Pulses: Normal pulses  Heart sounds: Normal heart sounds  No murmur heard  No friction rub  No gallop  Pulmonary:      Effort: Pulmonary effort is normal  No respiratory distress  Breath sounds: Normal breath sounds  No stridor  No wheezing, rhonchi or rales  Musculoskeletal:      Cervical back: Normal range of motion and neck supple  Right lower leg: No edema  Left lower leg: No edema  Right foot: Decreased range of motion  Tenderness (Right great toe, small amount of purulent drainage coming from the nail bed  No paronychia noted ) present  Skin:     General: Skin is warm and dry  Capillary Refill: Capillary refill takes less than 2 seconds  Neurological:      General: No focal deficit present  Mental Status: He is alert and oriented to person, place, and time     Psychiatric:         Mood and Affect: Mood normal  Behavior: Behavior normal          Vital Signs  ED Triage Vitals [06/08/22 2246]   Temperature Pulse Respirations Blood Pressure SpO2   98 3 °F (36 8 °C) 92 19 146/95 97 %      Temp Source Heart Rate Source Patient Position - Orthostatic VS BP Location FiO2 (%)   Oral -- -- -- --      Pain Score       --           Vitals:    06/08/22 2246   BP: 146/95   Pulse: 92         Visual Acuity      ED Medications  Medications   ketorolac (TORADOL) injection 15 mg (has no administration in time range)   cephalexin (KEFLEX) capsule 500 mg (has no administration in time range)       Diagnostic Studies  Results Reviewed     None                 XR foot 3+ views RIGHT    (Results Pending)              Procedures  Procedures         ED Course                               SBIRT 22yo+    Flowsheet Row Most Recent Value   SBIRT (23 yo +)    In order to provide better care to our patients, we are screening all of our patients for alcohol and drug use  Would it be okay to ask you these screening questions? Yes Filed at: 06/08/2022 2359   Initial Alcohol Screen: US AUDIT-C     1  How often do you have a drink containing alcohol? 2 Filed at: 06/08/2022 2359   2  How many drinks containing alcohol do you have on a typical day you are drinking? 1 Filed at: 06/08/2022 2359   3a  Male UNDER 65: How often do you have five or more drinks on one occasion? 0 Filed at: 06/08/2022 2359   Audit-C Score 3 Filed at: 06/08/2022 2359   BOGDAN: How many times in the past year have you    Used an illegal drug or used a prescription medication for non-medical reasons? Never Filed at: 06/08/2022 2359                    MDM  Number of Diagnoses or Management Options  Contusion of right great toe with damage to nail, initial encounter  Diagnosis management comments: Patient presenting for evaluation of contusion to his right great toe  Upon arrival, he appears comfortable  He is not any acute distress  Vital signs unremarkable    He has some tenderness to the right great toe as well as associated decreased range of motion  Toenail appears detached from nail bed, will likely fall off in the next couple of days  No subungual hematoma noted  There is a small amount of purulent drainage coming from the nail bed, will initiate Keflex  Patient was given Toradol for pain control  X-ray did not reveal any acute osseous  Crutches were offered, however patient declined  He was started on Keflex and given instructions to follow-up with podiatry  Strict return precautions were discussed  He is in stable condition at time of discharge  Wound was cleaned with Betadine and water  Amount and/or Complexity of Data Reviewed  Tests in the radiology section of CPT®: ordered and reviewed    Patient Progress  Patient progress: stable      Disposition  Final diagnoses:   Contusion of right great toe with damage to nail, initial encounter     Time reflects when diagnosis was documented in both MDM as applicable and the Disposition within this note     Time User Action Codes Description Comment    6/9/2022 12:19 AM Puneet Mckee [K90 211A] Contusion of right great toe with damage to nail, initial encounter       ED Disposition     ED Disposition   Discharge    Condition   Stable    Date/Time   Thu Jun 9, 2022 12:19 AM    Comment   Jenny Bo discharge to home/self care                 Follow-up Information     Follow up With Specialties Details Why Contact Info Additional 2000 [facility] Emergency Department Emergency Medicine Go to  If symptoms worsen 34 [facility] 109 [facility] Emergency Department, [address] [place] [address], [phone]    [facility] Schedule an appointment as soon as possible for a visit  for follow up [address] [facility] [address] [place] [address] [place] [address]  [phone]             Patient's Medications   Discharge Prescriptions CEPHALEXIN (KEFLEX) 500 MG CAPSULE    Take 1 capsule (500 mg total) by mouth every 6 (six) hours for 7 days       Start Date: 6/9/2022  End Date: 6/16/2022       Order Dose: 500 mg       Quantity: 28 capsule    Refills: 0       No discharge procedures on file      PDMP Review     None          ED Provider  Electronically Signed by           Danyell Yarbrough PA-C  06/09/22 0025

## 2022-12-10 ENCOUNTER — HOSPITAL ENCOUNTER (EMERGENCY)
Facility: HOSPITAL | Age: 22
Discharge: HOME | End: 2022-12-10
Attending: EMERGENCY MEDICINE

## 2022-12-10 ENCOUNTER — APPOINTMENT (EMERGENCY)
Dept: CT IMAGING | Facility: HOSPITAL | Age: 22
End: 2022-12-10

## 2022-12-10 VITALS
SYSTOLIC BLOOD PRESSURE: 168 MMHG | HEART RATE: 109 BPM | RESPIRATION RATE: 18 BRPM | OXYGEN SATURATION: 96 % | DIASTOLIC BLOOD PRESSURE: 97 MMHG | TEMPERATURE: 97.2 F

## 2022-12-10 DIAGNOSIS — N73.0 PID (ACUTE PELVIC INFLAMMATORY DISEASE): Primary | ICD-10-CM

## 2022-12-10 LAB
ALBUMIN SERPL BCP-MCNC: 3.5 G/DL (ref 3.5–5)
ALP SERPL-CCNC: 60 U/L (ref 46–116)
ALT SERPL W P-5'-P-CCNC: 50 U/L (ref 12–78)
ANION GAP SERPL CALCULATED.3IONS-SCNC: 7 MMOL/L (ref 4–13)
AST SERPL W P-5'-P-CCNC: 21 U/L (ref 5–45)
BACTERIA UR QL AUTO: ABNORMAL /HPF
BASOPHILS # BLD AUTO: 0.05 THOUSANDS/ÂΜL (ref 0–0.1)
BASOPHILS NFR BLD AUTO: 1 % (ref 0–1)
BILIRUB SERPL-MCNC: 0.26 MG/DL (ref 0.2–1)
BILIRUB UR QL STRIP: NEGATIVE
BUN SERPL-MCNC: 13 MG/DL (ref 5–25)
CALCIUM SERPL-MCNC: 8.4 MG/DL (ref 8.3–10.1)
CHLORIDE SERPL-SCNC: 104 MMOL/L (ref 96–108)
CLARITY UR: CLEAR
CO2 SERPL-SCNC: 26 MMOL/L (ref 21–32)
COLOR UR: YELLOW
CREAT SERPL-MCNC: 0.76 MG/DL (ref 0.6–1.3)
EOSINOPHIL # BLD AUTO: 0.21 THOUSAND/ÂΜL (ref 0–0.61)
EOSINOPHIL NFR BLD AUTO: 2 % (ref 0–6)
ERYTHROCYTE [DISTWIDTH] IN BLOOD BY AUTOMATED COUNT: 14 % (ref 11.6–15.1)
GLUCOSE SERPL-MCNC: 111 MG/DL (ref 65–140)
GLUCOSE UR STRIP-MCNC: NEGATIVE MG/DL
HCG SERPL QL: NEGATIVE
HCT VFR BLD AUTO: 45.6 % (ref 36.5–46.1)
HGB BLD-MCNC: 14.7 G/DL (ref 12–15.4)
HGB UR QL STRIP.AUTO: NEGATIVE
IMM GRANULOCYTES # BLD AUTO: 0.03 THOUSAND/UL (ref 0–0.2)
IMM GRANULOCYTES NFR BLD AUTO: 0 % (ref 0–2)
KETONES UR STRIP-MCNC: NEGATIVE MG/DL
LEUKOCYTE ESTERASE UR QL STRIP: NEGATIVE
LIPASE SERPL-CCNC: 86 U/L (ref 73–393)
LYMPHOCYTES # BLD AUTO: 3.01 THOUSANDS/ÂΜL (ref 0.6–4.47)
LYMPHOCYTES NFR BLD AUTO: 34 % (ref 14–44)
MCH RBC QN AUTO: 28.2 PG (ref 26.8–34.3)
MCHC RBC AUTO-ENTMCNC: 32.2 G/DL (ref 31.4–37.4)
MCV RBC AUTO: 87 FL (ref 82–98)
MONOCYTES # BLD AUTO: 0.45 THOUSAND/ÂΜL (ref 0.17–1.22)
MONOCYTES NFR BLD AUTO: 5 % (ref 4–12)
MUCOUS THREADS UR QL AUTO: ABNORMAL
NEUTROPHILS # BLD AUTO: 5.17 THOUSANDS/ÂΜL (ref 1.85–7.62)
NEUTS SEG NFR BLD AUTO: 58 % (ref 43–75)
NITRITE UR QL STRIP: NEGATIVE
NON-SQ EPI CELLS URNS QL MICRO: ABNORMAL /HPF
NRBC BLD AUTO-RTO: 0 /100 WBCS
PH UR STRIP.AUTO: 6.5 [PH]
PLATELET # BLD AUTO: 328 THOUSANDS/UL (ref 149–390)
PMV BLD AUTO: 9 FL (ref 8.9–12.7)
POTASSIUM SERPL-SCNC: 3.8 MMOL/L (ref 3.5–5.3)
PROT SERPL-MCNC: 7 G/DL (ref 6.4–8.4)
PROT UR STRIP-MCNC: ABNORMAL MG/DL
RBC # BLD AUTO: 5.22 MILLION/UL (ref 3.88–5.12)
RBC #/AREA URNS AUTO: ABNORMAL /HPF
SODIUM SERPL-SCNC: 137 MMOL/L (ref 135–147)
SP GR UR STRIP.AUTO: 1.03 (ref 1–1.03)
UROBILINOGEN UR STRIP-ACNC: <2 MG/DL
WBC # BLD AUTO: 8.92 THOUSAND/UL (ref 4.31–10.16)
WBC #/AREA URNS AUTO: ABNORMAL /HPF

## 2022-12-10 RX ORDER — DOXYCYCLINE HYCLATE 100 MG/1
100 CAPSULE ORAL ONCE
Status: COMPLETED | OUTPATIENT
Start: 2022-12-10 | End: 2022-12-10

## 2022-12-10 RX ORDER — DOXYCYCLINE HYCLATE 100 MG/1
100 CAPSULE ORAL 2 TIMES DAILY
Qty: 28 CAPSULE | Refills: 0 | Status: SHIPPED | OUTPATIENT
Start: 2022-12-10 | End: 2022-12-24

## 2022-12-10 RX ORDER — METRONIDAZOLE 500 MG/1
500 TABLET ORAL ONCE
Status: COMPLETED | OUTPATIENT
Start: 2022-12-10 | End: 2022-12-10

## 2022-12-10 RX ORDER — ACETAMINOPHEN 325 MG/1
975 TABLET ORAL ONCE
Status: COMPLETED | OUTPATIENT
Start: 2022-12-10 | End: 2022-12-10

## 2022-12-10 RX ORDER — METRONIDAZOLE 500 MG/1
500 TABLET ORAL EVERY 12 HOURS SCHEDULED
Qty: 28 TABLET | Refills: 0 | Status: SHIPPED | OUTPATIENT
Start: 2022-12-10 | End: 2022-12-24

## 2022-12-10 RX ORDER — KETOROLAC TROMETHAMINE 30 MG/ML
15 INJECTION, SOLUTION INTRAMUSCULAR; INTRAVENOUS ONCE
Status: COMPLETED | OUTPATIENT
Start: 2022-12-10 | End: 2022-12-10

## 2022-12-10 RX ADMIN — WATER 500 MG: 1 INJECTION INTRAMUSCULAR; INTRAVENOUS; SUBCUTANEOUS at 13:56

## 2022-12-10 RX ADMIN — IOHEXOL 100 ML: 350 INJECTION, SOLUTION INTRAVENOUS at 12:52

## 2022-12-10 RX ADMIN — METRONIDAZOLE 500 MG: 500 TABLET ORAL at 13:57

## 2022-12-10 RX ADMIN — KETOROLAC TROMETHAMINE 15 MG: 30 INJECTION, SOLUTION INTRAMUSCULAR; INTRAVENOUS at 12:23

## 2022-12-10 RX ADMIN — DOXYCYCLINE HYCLATE 100 MG: 100 CAPSULE ORAL at 13:57

## 2022-12-10 RX ADMIN — ACETAMINOPHEN 975 MG: 325 TABLET, FILM COATED ORAL at 12:23

## 2022-12-10 NOTE — ED PROVIDER NOTES
History  Chief Complaint   Patient presents with   • Abdominal Pain     C/o LLQ abd pain, also c/o nausea  Patient is a 17-year-old transgender male past medical history of bipolar disorder, PTSD, ADHD, anxiety and depression, presenting with abdominal pain  Patient notes left mid abdominal pain as well as bilateral lower quadrant pain which he states is aching in nature for the past 2 to 3 days, intermittent, better with exertion and worse with rest, nonradiating  Has not taken any medication for it  Notes intermittent lightheadedness as well as vomiting in the morning intermittently for the past week  States that he initially had clear sticky vaginal discharge but that has since cleared  States that menstrual period is roughly 2 weeks late and he has taken multiple pregnancy test at home that were negative  Denies any diarrhea or constipation, fevers, chest pain, shortness of breath, vision changes, dysuria, rashes  Notes urinary frequency and urgency which he states is baseline  Is not taking any hormone therapy currently  Sexually active with more than 1 partner in the last month and states may have been some concern for STDs  Prior to Admission Medications   Prescriptions Last Dose Informant Patient Reported?  Taking?   ondansetron (Zofran ODT) 4 mg disintegrating tablet   No No   Sig: Take 1 tablet (4 mg total) by mouth every 8 (eight) hours as needed for nausea or vomiting      Facility-Administered Medications: None       Past Medical History:   Diagnosis Date   • ADHD    • Anxiety    • Bipolar 1 disorder (HCC)    • Chemical pneumonitis (HCC)    • Depression    • Gastritis    • PTSD (post-traumatic stress disorder)        Past Surgical History:   Procedure Laterality Date   • WISDOM TOOTH EXTRACTION         Family History   Problem Relation Age of Onset   • Stroke Mother    • Heart disease Mother    • Bipolar disorder Father    • Heart disease Sister    • Ovarian cysts Sister      I have reviewed and agree with the history as documented  E-Cigarette/Vaping   • E-Cigarette Use Never User      E-Cigarette/Vaping Substances     Social History     Tobacco Use   • Smoking status: Every Day     Packs/day: 0 50     Types: Cigarettes   • Smokeless tobacco: Never   Vaping Use   • Vaping Use: Never used   Substance Use Topics   • Alcohol use: Yes     Comment: social   • Drug use: Yes     Types: Marijuana       Review of Systems   All other systems reviewed and are negative  Physical Exam  Physical Exam  Vitals reviewed  Constitutional:       General: He is not in acute distress  Appearance: Normal appearance  He is not ill-appearing  HENT:      Mouth/Throat:      Mouth: Mucous membranes are moist    Eyes:      Conjunctiva/sclera: Conjunctivae normal    Cardiovascular:      Rate and Rhythm: Normal rate and regular rhythm  Heart sounds: Normal heart sounds  Pulmonary:      Effort: Pulmonary effort is normal       Breath sounds: Normal breath sounds  Abdominal:      General: Abdomen is flat  Palpations: Abdomen is soft  Tenderness: There is abdominal tenderness  There is no right CVA tenderness or left CVA tenderness  Comments: Bilateral lower quadrant tenderness without guarding   Musculoskeletal:         General: No swelling  Normal range of motion  Cervical back: Neck supple  Right lower leg: No edema  Left lower leg: No edema  Skin:     General: Skin is warm and dry  Neurological:      General: No focal deficit present  Mental Status: He is alert     Psychiatric:         Mood and Affect: Mood normal          Vital Signs  ED Triage Vitals [12/10/22 1132]   Temperature Pulse Respirations Blood Pressure SpO2   (!) 97 2 °F (36 2 °C) (!) 109 18 168/97 96 %      Temp Source Heart Rate Source Patient Position - Orthostatic VS BP Location FiO2 (%)   Temporal Monitor Sitting Left arm --      Pain Score       --           Vitals:    12/10/22 1132 BP: 168/97   Pulse: (!) 109   Patient Position - Orthostatic VS: Sitting         Visual Acuity      ED Medications  Medications   ketorolac (TORADOL) injection 15 mg (has no administration in time range)   acetaminophen (TYLENOL) tablet 975 mg (has no administration in time range)       Diagnostic Studies  Results Reviewed     None                 No orders to display              Procedures  Procedures         ED Course  ED Course as of 12/10/22 2156   Sat Dec 10, 2022   1224 Patient has mild CMT on exam, if no other causes found on CT will likely treat for pelvic inflammatory disease  1339 CT unremarkable, will give ceftriaxone, first doses of doxycycline and Flagyl and will discharge with prescription and OB/GYN follow-up  MDM  Number of Diagnoses or Management Options  Diagnosis management comments: Patient is a 75-year-old transgender male past medical history bipolar disorder, anxiety depression, PTSD, ADHD presenting with abdominal pain  Patient is well-appearing at bedside with stable vitals and in no acute distress  He does have low-grade tachycardia, mild bilateral lower quadrant tenderness without guarding, no other significant physical exam findings  Will obtain labs, urinalysis, CT abdomen pelvis, GC chlamydia testing, and will perform bimanual exam to assess for signs of PID, have low concern for torsion based on bilaterality of symptoms, benign abdominal exam but will consider ultrasound if adnexal tenderness found on bimanual exam       Disposition  Final diagnoses:   None     ED Disposition     None      Follow-up Information    None         Patient's Medications   Discharge Prescriptions    No medications on file       No discharge procedures on file      PDMP Review     None          ED Provider  Electronically Signed by           Fito Martinez DO  12/10/22 2156

## 2022-12-12 LAB
BACTERIA UR CULT: NORMAL
C TRACH DNA SPEC QL NAA+PROBE: NEGATIVE
N GONORRHOEA DNA SPEC QL NAA+PROBE: NEGATIVE

## 2023-04-08 ENCOUNTER — HOSPITAL ENCOUNTER (EMERGENCY)
Facility: HOSPITAL | Age: 23
Discharge: HOME | End: 2023-04-08
Attending: EMERGENCY MEDICINE | Admitting: EMERGENCY MEDICINE

## 2023-04-08 VITALS
DIASTOLIC BLOOD PRESSURE: 99 MMHG | SYSTOLIC BLOOD PRESSURE: 172 MMHG | HEART RATE: 97 BPM | OXYGEN SATURATION: 97 % | RESPIRATION RATE: 18 BRPM | TEMPERATURE: 98.6 F

## 2023-04-08 DIAGNOSIS — R68.89 FLU-LIKE SYMPTOMS: Primary | ICD-10-CM

## 2023-04-08 LAB
FLUAV RNA RESP QL NAA+PROBE: NEGATIVE
FLUBV RNA RESP QL NAA+PROBE: NEGATIVE
RSV RNA RESP QL NAA+PROBE: NEGATIVE
S PYO DNA THROAT QL NAA+PROBE: NOT DETECTED
SARS-COV-2 RNA RESP QL NAA+PROBE: POSITIVE

## 2023-04-08 NOTE — Clinical Note
Antonieta Kidd was seen and treated in our emergency department on 4/8/2023     ?    ? ? Diagnosis: ?    Chantell Ok  may return to work on return date  He may return on this date: 04/14/2023    Jennifer Iyer was seen by me for an illness that started on 4/5/23  He should be safe to return to work 4/14/23  If you have any questions or concerns, please don't hesitate to call        Claritza Lara MD    ______________________________           _______________          _______________  Hospital Representative                              Date                                Time

## 2023-04-09 NOTE — ED PROVIDER NOTES
History  Chief Complaint   Patient presents with   • Flu Symptoms     Pt arrives ambulatory with a c/o runny nose, cough, SOB, loss of taste since Wednesday  Positive covid contacts  HPI    Prior to Admission Medications   Prescriptions Last Dose Informant Patient Reported? Taking?   ondansetron (Zofran ODT) 4 mg disintegrating tablet   No No   Sig: Take 1 tablet (4 mg total) by mouth every 8 (eight) hours as needed for nausea or vomiting      Facility-Administered Medications: None       Past Medical History:   Diagnosis Date   • ADHD    • Anxiety    • Bipolar 1 disorder (HCC)    • Chemical pneumonitis (HCC)    • Depression    • Gastritis    • PTSD (post-traumatic stress disorder)        Past Surgical History:   Procedure Laterality Date   • WISDOM TOOTH EXTRACTION         Family History   Problem Relation Age of Onset   • Stroke Mother    • Heart disease Mother    • Bipolar disorder Father    • Heart disease Sister    • Ovarian cysts Sister      I have reviewed and agree with the history as documented      E-Cigarette/Vaping   • E-Cigarette Use Never User      E-Cigarette/Vaping Substances   • Nicotine No    • THC No    • CBD No    • Flavoring No    • Other No    • Unknown No      Social History     Tobacco Use   • Smoking status: Every Day     Packs/day: 0 50     Types: Cigarettes   • Smokeless tobacco: Never   Vaping Use   • Vaping Use: Never used   Substance Use Topics   • Alcohol use: Yes     Comment: social   • Drug use: Yes     Types: Marijuana       Review of Systems    Physical Exam  Physical Exam    Vital Signs  ED Triage Vitals [04/08/23 2008]   Temperature Pulse Respirations Blood Pressure SpO2   98 6 °F (37 °C) 97 18 (!) 172/99 97 %      Temp Source Heart Rate Source Patient Position - Orthostatic VS BP Location FiO2 (%)   Temporal Monitor Sitting Left arm --      Pain Score       --           Vitals:    04/08/23 2008   BP: (!) 172/99   Pulse: 97   Patient Position - Orthostatic VS: Sitting Visual Acuity      ED Medications  Medications - No data to display    Diagnostic Studies  Results Reviewed     Procedure Component Value Units Date/Time    Strep A PCR [[id number]] Collected: 04/08/23 2013    Lab Status: In process Specimen: Throat Updated: 04/08/23 2018    FLU/RSV/COVID - if FLU/RSV clinically relevant [[id number]] Collected: 04/08/23 2013    Lab Status: In process Specimen: Nares from Nose Updated: 04/08/23 2018                 No orders to display              Procedures  Procedures         ED Course                                             MDM    Disposition  Final diagnoses:   None     ED Disposition     None      Follow-up Information    None         Patient's Medications   Discharge Prescriptions    No medications on file       No discharge procedures on file      PDMP Review     None          ED Provider  Electronically Signed by

## 2023-05-25 NOTE — ED PROVIDER NOTES
History  Chief Complaint   Patient presents with   • Flu Symptoms     Pt arrives ambulatory with a c/o runny nose, cough, SOB, loss of taste since Wednesday  Positive covid contacts  19 yo male here for runny nose, sore throat, and mild cough that has been going on for a few days  Also reports loss of taste/smell  Here with his partner who has similar sx  Both had recent covid exposure  No fevers no overt cp/sob  Reports no major medical problems  History provided by:  Patient  Flu Symptoms  Presenting symptoms: cough, rhinorrhea and sore throat    Presenting symptoms: no fatigue, no fever, no nausea, no shortness of breath and no vomiting        Prior to Admission Medications   Prescriptions Last Dose Informant Patient Reported? Taking?   ondansetron (Zofran ODT) 4 mg disintegrating tablet   No No   Sig: Take 1 tablet (4 mg total) by mouth every 8 (eight) hours as needed for nausea or vomiting      Facility-Administered Medications: None       Past Medical History:   Diagnosis Date   • ADHD    • Anxiety    • Bipolar 1 disorder (HCC)    • Chemical pneumonitis (HCC)    • Depression    • Gastritis    • PTSD (post-traumatic stress disorder)        Past Surgical History:   Procedure Laterality Date   • WISDOM TOOTH EXTRACTION         Family History   Problem Relation Age of Onset   • Stroke Mother    • Heart disease Mother    • Bipolar disorder Father    • Heart disease Sister    • Ovarian cysts Sister      I have reviewed and agree with the history as documented      E-Cigarette/Vaping   • E-Cigarette Use Never User      E-Cigarette/Vaping Substances   • Nicotine No    • THC No    • CBD No    • Flavoring No    • Other No    • Unknown No      Social History     Tobacco Use   • Smoking status: Every Day     Packs/day: 0 50     Types: Cigarettes   • Smokeless tobacco: Never   Vaping Use   • Vaping Use: Never used   Substance Use Topics   • Alcohol use: Yes     Comment: social   • Drug use: Yes     Types: Marijuana       Review of Systems   Constitutional: Negative for fatigue and fever  HENT: Positive for rhinorrhea and sore throat  Respiratory: Positive for cough  Negative for shortness of breath  Cardiovascular: Negative for chest pain  Gastrointestinal: Negative for abdominal pain, nausea and vomiting  Physical Exam  Physical Exam  Vitals and nursing note reviewed  Constitutional:       General: He is not in acute distress  Appearance: He is well-developed  HENT:      Head: Normocephalic and atraumatic  Eyes:      Conjunctiva/sclera: Conjunctivae normal    Cardiovascular:      Rate and Rhythm: Normal rate and regular rhythm  Heart sounds: No murmur heard  Pulmonary:      Effort: Pulmonary effort is normal  No respiratory distress  Breath sounds: Normal breath sounds  Abdominal:      Palpations: Abdomen is soft  Tenderness: There is no abdominal tenderness  Musculoskeletal:         General: No swelling  Cervical back: Neck supple  Skin:     General: Skin is warm and dry  Capillary Refill: Capillary refill takes less than 2 seconds  Neurological:      General: No focal deficit present  Mental Status: He is alert and oriented to person, place, and time     Psychiatric:         Mood and Affect: Mood normal          Vital Signs  ED Triage Vitals [04/08/23 2008]   Temperature Pulse Respirations Blood Pressure SpO2   98 6 °F (37 °C) 97 18 (!) 172/99 97 %      Temp Source Heart Rate Source Patient Position - Orthostatic VS BP Location FiO2 (%)   Temporal Monitor Sitting Left arm --      Pain Score       --           Vitals:    04/08/23 2008   BP: (!) 172/99   Pulse: 97   Patient Position - Orthostatic VS: Sitting         Visual Acuity      ED Medications  Medications - No data to display    Diagnostic Studies  Results Reviewed     Procedure Component Value Units Date/Time    FLU/RSV/COVID - if FLU/RSV clinically relevant [[id number]]  (Abnormal) Collected: 04/08/23 2013    Lab Status: Final result Specimen: Nares from Nose Updated: 04/08/23 2112     SARS-CoV-2 Positive     INFLUENZA A PCR Negative     INFLUENZA B PCR Negative     RSV PCR Negative    Narrative:      FOR PEDIATRIC PATIENTS - copy/paste COVID Guidelines URL to browser: [email or web address]    SARS-CoV-2 assay is a Nucleic Acid Amplification assay intended for the  qualitative detection of nucleic acid from SARS-CoV-2 in nasopharyngeal  swabs  Results are for the presumptive identification of SARS-CoV-2 RNA  Positive results are indicative of infection with SARS-CoV-2, the virus  causing COVID-19, but do not rule out bacterial infection or co-infection  with other viruses  Laboratories within the [place] and its  territories are required to report all positive results to the appropriate  public health authorities  Negative results do not preclude SARS-CoV-2  infection and should not be used as the sole basis for treatment or other  patient management decisions  Negative results must be combined with  clinical observations, patient history, and epidemiological information  This test has not been FDA cleared or approved  This test has been authorized by FDA under an Emergency Use Authorization  (EUA)  This test is only authorized for the duration of time the  declaration that circumstances exist justifying the authorization of the  emergency use of an in vitro diagnostic tests for detection of SARS-CoV-2  virus and/or diagnosis of COVID-19 infection under section 564(b)(1) of  the Act, 21 U  S C  633JYJ-1(R)(6), unless the authorization is terminated  or revoked sooner  The test has been validated but independent review by FDA  and CLIA is pending  Test performed using [employer] GeneXpert: This RT-PCR assay targets N2,  a region unique to SARS-CoV-2   A conserved region in the E-gene was chosen  for pan-Sarbecovirus detection which includes SARS-CoV-2  According to CMS-2020-01-R, this platform meets the definition of high-throughput technology  Strep A PCR [[id number]]  (Normal) Collected: 04/08/23 2013    Lab Status: Final result Specimen: Throat Updated: 04/08/23 2100     STREP A PCR Not Detected                 No orders to display              Procedures  Procedures         ED Course                                             Medical Decision Making  19 yo otherwise healthy male with uri sx for a few days  Suspect covid given loss of taste/smell and recent exposure  PE and vs reassuring  Plan for op fu and return precautions  Flu-like symptoms: acute illness or injury  Amount and/or Complexity of Data Reviewed  Labs: ordered  Disposition  Final diagnoses:   Flu-like symptoms     Time reflects when diagnosis was documented in both MDM as applicable and the Disposition within this note     Time User Action Codes Description Comment    4/8/2023  8:44 PM Gordy Bruce Add [R68 89] Flu-like symptoms       ED Disposition     ED Disposition   Discharge    Condition   Stable    Date/Time   Sat Apr 8, 2023  8:44 PM    Comment   Dorene Kang discharge to home/self care  Follow-up Information     Follow up With Specialties Details Why Contact Info    Emmy Bach, [address], Nurse Practitioner   21 [phone] [address] [facility] [address]  [phone]            Discharge Medication List as of 4/8/2023  8:48 PM      CONTINUE these medications which have NOT CHANGED    Details   ondansetron (Zofran ODT) 4 mg disintegrating tablet Take 1 tablet (4 mg total) by mouth every 8 (eight) hours as needed for nausea or vomiting, Starting Thu 5/26/2022, Normal             No discharge procedures on file      PDMP Review     None          ED Provider  Electronically Signed by           Argenis Higginbotham MD  05/25/23 1606

## 2023-08-17 ENCOUNTER — HOSPITAL ENCOUNTER (EMERGENCY)
Facility: HOSPITAL | Age: 23
Discharge: HOME | End: 2023-08-18
Attending: EMERGENCY MEDICINE | Admitting: EMERGENCY MEDICINE
Payer: COMMERCIAL

## 2023-08-17 VITALS
DIASTOLIC BLOOD PRESSURE: 113 MMHG | OXYGEN SATURATION: 96 % | RESPIRATION RATE: 18 BRPM | TEMPERATURE: 98 F | SYSTOLIC BLOOD PRESSURE: 156 MMHG | HEART RATE: 80 BPM | HEIGHT: 67 IN | BODY MASS INDEX: 40.18 KG/M2 | WEIGHT: 256 LBS

## 2023-08-17 DIAGNOSIS — R19.7 ACUTE DIARRHEA: Primary | ICD-10-CM

## 2023-08-17 LAB
BASOPHILS # BLD AUTO: 0.03 THOUSANDS/ÂΜL (ref 0–0.1)
BASOPHILS NFR BLD AUTO: 0 % (ref 0–1)
EOSINOPHIL # BLD AUTO: 0.3 THOUSAND/ÂΜL (ref 0–0.61)
EOSINOPHIL NFR BLD AUTO: 3 % (ref 0–6)
ERYTHROCYTE [DISTWIDTH] IN BLOOD BY AUTOMATED COUNT: 14.3 % (ref 11.6–15.1)
EXT PREGNANCY TEST URINE: NEGATIVE
EXT. CONTROL: NORMAL
HCT VFR BLD AUTO: 41.1 % (ref 36.5–46.1)
HGB BLD-MCNC: 13.3 G/DL (ref 12–15.4)
IMM GRANULOCYTES # BLD AUTO: 0.02 THOUSAND/UL (ref 0–0.2)
IMM GRANULOCYTES NFR BLD AUTO: 0 % (ref 0–2)
LYMPHOCYTES # BLD AUTO: 2.53 THOUSANDS/ÂΜL (ref 0.6–4.47)
LYMPHOCYTES NFR BLD AUTO: 27 % (ref 14–44)
MCH RBC QN AUTO: 28.5 PG (ref 26.8–34.3)
MCHC RBC AUTO-ENTMCNC: 32.4 G/DL (ref 31.4–37.4)
MCV RBC AUTO: 88 FL (ref 82–98)
MONOCYTES # BLD AUTO: 0.76 THOUSAND/ÂΜL (ref 0.17–1.22)
MONOCYTES NFR BLD AUTO: 8 % (ref 4–12)
NEUTROPHILS # BLD AUTO: 5.67 THOUSANDS/ÂΜL (ref 1.85–7.62)
NEUTS SEG NFR BLD AUTO: 62 % (ref 43–75)
NRBC BLD AUTO-RTO: 0 /100 WBCS
PLATELET # BLD AUTO: 305 THOUSANDS/UL (ref 149–390)
PMV BLD AUTO: 9.3 FL (ref 8.9–12.7)
RBC # BLD AUTO: 4.66 MILLION/UL (ref 3.88–5.12)
WBC # BLD AUTO: 9.31 THOUSAND/UL (ref 4.31–10.16)

## 2023-08-17 PROCEDURE — 85025 COMPLETE CBC W/AUTO DIFF WBC: CPT | Performed by: PHYSICIAN ASSISTANT

## 2023-08-17 PROCEDURE — 96360 HYDRATION IV INFUSION INIT: CPT

## 2023-08-17 PROCEDURE — 83690 ASSAY OF LIPASE: CPT | Performed by: PHYSICIAN ASSISTANT

## 2023-08-17 PROCEDURE — 99284 EMERGENCY DEPT VISIT MOD MDM: CPT

## 2023-08-17 PROCEDURE — 80053 COMPREHEN METABOLIC PANEL: CPT | Performed by: PHYSICIAN ASSISTANT

## 2023-08-17 PROCEDURE — 81025 URINE PREGNANCY TEST: CPT | Performed by: PHYSICIAN ASSISTANT

## 2023-08-17 PROCEDURE — 36415 COLL VENOUS BLD VENIPUNCTURE: CPT | Performed by: PHYSICIAN ASSISTANT

## 2023-08-17 RX ADMIN — SODIUM CHLORIDE 1000 ML: 0.9 INJECTION, SOLUTION INTRAVENOUS at 23:19

## 2023-08-17 NOTE — Clinical Note
Heather Garrickirina was seen and treated in our emergency department on 8/17/2023. Diagnosis:     Saima Davis  may return to work on return date. He may return on this date: 08/21/2023         If you have any questions or concerns, please don't hesitate to call.       Марина Montano PA-C    ______________________________           _______________          _______________  Hospital Representative                              Date                                Time

## 2023-08-18 LAB
ALBUMIN SERPL BCP-MCNC: 4.8 G/DL (ref 3.5–5)
ALP SERPL-CCNC: 62 U/L (ref 34–104)
ALT SERPL W P-5'-P-CCNC: 52 U/L (ref 7–52)
ANION GAP SERPL CALCULATED.3IONS-SCNC: 6 MMOL/L
AST SERPL W P-5'-P-CCNC: 33 U/L (ref 5–45)
BILIRUB SERPL-MCNC: 0.21 MG/DL (ref 0.2–1)
BUN SERPL-MCNC: 13 MG/DL (ref 5–25)
CALCIUM SERPL-MCNC: 10 MG/DL (ref 8.4–10.2)
CHLORIDE SERPL-SCNC: 106 MMOL/L (ref 96–108)
CO2 SERPL-SCNC: 24 MMOL/L (ref 21–32)
CREAT SERPL-MCNC: 0.84 MG/DL (ref 0.6–1.3)
GLUCOSE SERPL-MCNC: 114 MG/DL (ref 65–140)
LIPASE SERPL-CCNC: 21 U/L (ref 11–82)
POTASSIUM SERPL-SCNC: 3.7 MMOL/L (ref 3.5–5.3)
PROT SERPL-MCNC: 7.6 G/DL (ref 6.4–8.4)
SODIUM SERPL-SCNC: 136 MMOL/L (ref 135–147)

## 2023-08-18 PROCEDURE — 99284 EMERGENCY DEPT VISIT MOD MDM: CPT | Performed by: PHYSICIAN ASSISTANT

## 2023-08-18 RX ORDER — DICYCLOMINE HCL 20 MG
20 TABLET ORAL ONCE
Status: COMPLETED | OUTPATIENT
Start: 2023-08-18 | End: 2023-08-18

## 2023-08-18 RX ORDER — DICYCLOMINE HCL 20 MG
20 TABLET ORAL 2 TIMES DAILY
Qty: 20 TABLET | Refills: 0 | Status: SHIPPED | OUTPATIENT
Start: 2023-08-18

## 2023-08-18 RX ORDER — HYDROCORTISONE 25 MG/G
CREAM TOPICAL 4 TIMES DAILY PRN
Status: DISCONTINUED | OUTPATIENT
Start: 2023-08-18 | End: 2023-08-18 | Stop reason: HOSPADM

## 2023-08-18 RX ADMIN — HYDROCORTISONE 1 APPLICATION: 25 CREAM TOPICAL at 00:39

## 2023-08-18 RX ADMIN — DICYCLOMINE HYDROCHLORIDE 20 MG: 20 TABLET ORAL at 00:34

## 2023-08-18 NOTE — ED PROVIDER NOTES
History  Chief Complaint   Patient presents with   • Diarrhea     Patient reports diarrhea for the last week. Patient states that everything is going "right through" him. +abdominal pain. Patient is a 68-year-old female transitioning to male presenting to the emergency department for evaluation of diarrhea for the last 4 to 5 days. He states that he has been having "innumerable" episodes of watery diarrhea daily for the last 5 days. Symptoms have been getting worse. He is having minimal abdominal pain. He had nausea and vomiting 3 days ago, however the symptoms have since resolved. No recent antibiotic use, travel outside of the country, swimming in lakes or ponds. He has been able to tolerate a normal diet, however states that every time he eats he has a bowel movement shortly after. He is denying fevers, chills, chest pain, difficulty breathing. No other complaints at this time. Prior to Admission Medications   Prescriptions Last Dose Informant Patient Reported? Taking?   ondansetron (Zofran ODT) 4 mg disintegrating tablet   No No   Sig: Take 1 tablet (4 mg total) by mouth every 8 (eight) hours as needed for nausea or vomiting      Facility-Administered Medications: None       Past Medical History:   Diagnosis Date   • ADHD    • Anxiety    • Bipolar 1 disorder (HCC)    • Chemical pneumonitis (HCC)    • Depression    • Gastritis    • Hypertension    • PTSD (post-traumatic stress disorder)        Past Surgical History:   Procedure Laterality Date   • WISDOM TOOTH EXTRACTION         Family History   Problem Relation Age of Onset   • Stroke Mother    • Heart disease Mother    • Bipolar disorder Father    • Heart disease Sister    • Ovarian cysts Sister      I have reviewed and agree with the history as documented.     E-Cigarette/Vaping   • E-Cigarette Use Never User      E-Cigarette/Vaping Substances   • Nicotine No    • THC No    • CBD No    • Flavoring No    • Other No    • Unknown No Social History     Tobacco Use   • Smoking status: Every Day     Packs/day: 0.50     Types: Cigarettes   • Smokeless tobacco: Never   Vaping Use   • Vaping Use: Never used   Substance Use Topics   • Alcohol use: Yes     Comment: social   • Drug use: Yes     Types: Marijuana       Review of Systems   Constitutional: Negative for chills and fever. Respiratory: Negative for cough and shortness of breath. Cardiovascular: Negative for chest pain and palpitations. Gastrointestinal: Positive for abdominal pain, diarrhea, nausea and vomiting. Genitourinary: Negative for dysuria and hematuria. Musculoskeletal: Negative for arthralgias, back pain, myalgias, neck pain and neck stiffness. Skin: Negative for color change and rash. All other systems reviewed and are negative. Physical Exam  Physical Exam  Vitals reviewed. Constitutional:       General: He is not in acute distress. Appearance: Normal appearance. He is not ill-appearing, toxic-appearing or diaphoretic. HENT:      Head: Normocephalic and atraumatic. Right Ear: External ear normal.      Left Ear: External ear normal.      Nose: Nose normal. No congestion or rhinorrhea. Mouth/Throat:      Mouth: Mucous membranes are moist.      Pharynx: Oropharynx is clear. No oropharyngeal exudate or posterior oropharyngeal erythema. Eyes:      General: No scleral icterus. Right eye: No discharge. Left eye: No discharge. Extraocular Movements: Extraocular movements intact. Conjunctiva/sclera: Conjunctivae normal.   Cardiovascular:      Rate and Rhythm: Normal rate and regular rhythm. Pulses: Normal pulses. Heart sounds: Normal heart sounds. No murmur heard. No friction rub. No gallop. Pulmonary:      Effort: Pulmonary effort is normal. No respiratory distress. Breath sounds: Normal breath sounds. No stridor. No wheezing, rhonchi or rales. Abdominal:      General: Abdomen is flat. Palpations: Abdomen is soft. Tenderness: There is no abdominal tenderness. There is no guarding or rebound. Musculoskeletal:      Cervical back: Normal range of motion and neck supple. Right lower leg: No edema. Left lower leg: No edema. Skin:     General: Skin is warm and dry. Capillary Refill: Capillary refill takes less than 2 seconds. Neurological:      General: No focal deficit present. Mental Status: He is alert and oriented to person, place, and time. Psychiatric:         Mood and Affect: Mood normal.         Behavior: Behavior normal.         Vital Signs  ED Triage Vitals [08/17/23 2215]   Temperature Pulse Respirations Blood Pressure SpO2   98 °F (36.7 °C) 80 18 (!) 156/113 96 %      Temp Source Heart Rate Source Patient Position - Orthostatic VS BP Location FiO2 (%)   Tympanic Monitor Sitting Left arm --      Pain Score       --           Vitals:    08/17/23 2215   BP: (!) 156/113   Pulse: 80   Patient Position - Orthostatic VS: Sitting         Visual Acuity      ED Medications  Medications   sodium chloride 0.9 % bolus 1,000 mL (0 mL Intravenous Stopped 8/18/23 0032)   dicyclomine (BENTYL) tablet 20 mg (20 mg Oral Given 8/18/23 0034)       Diagnostic Studies  Results Reviewed     Procedure Component Value Units Date/Time    Comprehensive metabolic panel [[id number]] Collected: 08/17/23 2323    Lab Status: Final result Specimen: Blood from Arm, Right Updated: 08/18/23 0007     Sodium 136 mmol/L      Potassium 3.7 mmol/L      Chloride 106 mmol/L      CO2 24 mmol/L      ANION GAP 6 mmol/L      BUN 13 mg/dL      Creatinine 0.84 mg/dL      Glucose 114 mg/dL      Calcium 10.0 mg/dL      AST 33 U/L      ALT 52 U/L      Alkaline Phosphatase 62 U/L      Total Protein 7.6 g/dL      Albumin 4.8 g/dL      Total Bilirubin 0.21 mg/dL      eGFR --    Narrative:      Notes:     1. eGFR calculation is only valid for adults 18 years and older.   2. EGFR calculation cannot be performed for patients who are transgender, non-binary, or whose legal sex, sex at birth, and gender identity differ. Lipase [[id number]]  (Normal) Collected: 08/17/23 2323    Lab Status: Final result Specimen: Blood from Arm, Right Updated: 08/18/23 0007     Lipase 21 u/L     POCT pregnancy, urine [[id number]]  (Normal) Resulted: 08/17/23 2353    Lab Status: Final result Updated: 08/17/23 2353     EXT Preg Test, Ur Negative     Control Valid    CBC and differential [[id number]] Collected: 08/17/23 2323    Lab Status: Final result Specimen: Blood from Arm, Right Updated: 08/17/23 2344     WBC 9.31 Thousand/uL      RBC 4.66 Million/uL      Hemoglobin 13.3 g/dL      Hematocrit 41.1 %      MCV 88 fL      MCH 28.5 pg      MCHC 32.4 g/dL      RDW 14.3 %      MPV 9.3 fL      Platelets 050 Thousands/uL      nRBC 0 /100 WBCs      Neutrophils Relative 62 %      Immat GRANS % 0 %      Lymphocytes Relative 27 %      Monocytes Relative 8 %      Eosinophils Relative 3 %      Basophils Relative 0 %      Neutrophils Absolute 5.67 Thousands/µL      Immature Grans Absolute 0.02 Thousand/uL      Lymphocytes Absolute 2.53 Thousands/µL      Monocytes Absolute 0.76 Thousand/µL      Eosinophils Absolute 0.30 Thousand/µL      Basophils Absolute 0.03 Thousands/µL                  No orders to display              Procedures  Procedures         ED Course                                             Medical Decision Making  Patient presenting to the emergency department for evaluation of diarrhea. Upon arrival patient appears comfortable. He does not appear to be in any acute distress. He is hypertensive. Remainder vital signs unremarkable. No abdominal tenderness on examination. Heart regular rate and rhythm. Lungs clear to auscultation bilaterally. Laboratory evaluation obtained and reassuring. No electrolyte abnormalities. Patient was given 1 L of fluids and 20 mg of Bentyl for symptomatic relief.   He was discharged with a prescription for Bentyl. Symptoms likely secondary to viral gastroenteritis. He is in stable condition at time of discharge. Strict return precautions were discussed. Acute diarrhea: acute illness or injury  Amount and/or Complexity of Data Reviewed  Labs: ordered. Risk  Prescription drug management. Disposition  Final diagnoses:   Acute diarrhea     Time reflects when diagnosis was documented in both MDM as applicable and the Disposition within this note     Time User Action Codes Description Comment    8/18/2023 12:29 AM Alexander Coy Add [R19.7] Acute diarrhea       ED Disposition     ED Disposition   Discharge    Condition   Stable    Date/Time   Fri Aug 18, 2023 12:29 AM    Comment   Allyssa Rivera discharge to home/self care. Follow-up Information     Follow up With Specialties Details Why Contact Info Additional [facility] Emergency Department Emergency Medicine Go to  If symptoms worsen [address] [facility] Emergency Department, [place], [place], [address]          Discharge Medication List as of 8/18/2023 12:29 AM      START taking these medications    Details   dicyclomine (BENTYL) 20 mg tablet Take 1 tablet (20 mg total) by mouth 2 (two) times a day, Starting Fri 8/18/2023, Normal         CONTINUE these medications which have NOT CHANGED    Details   ondansetron (Zofran ODT) 4 mg disintegrating tablet Take 1 tablet (4 mg total) by mouth every 8 (eight) hours as needed for nausea or vomiting, Starting Thu 5/26/2022, Normal             No discharge procedures on file.     PDMP Review     None          ED Provider  Electronically Signed by           Ramila Ashley PA-C  08/18/23 5074

## 2024-01-04 ENCOUNTER — HOSPITAL ENCOUNTER (EMERGENCY)
Facility: HOSPITAL | Age: 24
Discharge: HOME | End: 2024-01-04
Attending: EMERGENCY MEDICINE
Payer: COMMERCIAL

## 2024-01-04 VITALS
OXYGEN SATURATION: 98 % | SYSTOLIC BLOOD PRESSURE: 157 MMHG | DIASTOLIC BLOOD PRESSURE: 85 MMHG | RESPIRATION RATE: 16 BRPM | TEMPERATURE: 98.2 F | HEART RATE: 100 BPM

## 2024-01-04 DIAGNOSIS — M54.50 ACUTE LOW BACK PAIN: Primary | ICD-10-CM

## 2024-01-04 PROCEDURE — 99283 EMERGENCY DEPT VISIT LOW MDM: CPT

## 2024-01-04 PROCEDURE — 99284 EMERGENCY DEPT VISIT MOD MDM: CPT | Performed by: PHYSICIAN ASSISTANT

## 2024-01-04 RX ORDER — ACETAMINOPHEN AND CODEINE PHOSPHATE 300; 30 MG/1; MG/1
1 TABLET ORAL EVERY 6 HOURS PRN
Qty: 15 TABLET | Refills: 0 | Status: SHIPPED | OUTPATIENT
Start: 2024-01-04

## 2024-01-04 RX ORDER — METHOCARBAMOL 750 MG/1
750 TABLET, FILM COATED ORAL 4 TIMES DAILY
Qty: 28 TABLET | Refills: 0 | Status: SHIPPED | OUTPATIENT
Start: 2024-01-04 | End: 2024-01-11

## 2024-01-04 RX ORDER — IBUPROFEN 600 MG/1
600 TABLET ORAL ONCE
Status: COMPLETED | OUTPATIENT
Start: 2024-01-04 | End: 2024-01-04

## 2024-01-04 RX ORDER — IBUPROFEN 400 MG/1
400 TABLET ORAL EVERY 6 HOURS PRN
Qty: 30 TABLET | Refills: 0 | Status: SHIPPED | OUTPATIENT
Start: 2024-01-04

## 2024-01-04 RX ADMIN — IBUPROFEN 600 MG: 600 TABLET, FILM COATED ORAL at 10:49

## 2024-01-04 NOTE — ED PROVIDER NOTES
History  Chief Complaint   Patient presents with    Back Pain     Pt presents with c/o back pain x2 weeks after wrestling with kids      23 y.o. adult presents to the Emergency Department with chief complaint of back pain.   Onset of symptoms is reported as 2 weeks ago  Location of symptoms is reported as right lower back  Quality of symptoms is reported as sharp tight pain  Severity of symptoms is reported as moderate-severe  Associated symptoms:  Denies urinary retention.  Denies bowel or bladder incontinence.  Denies abdominal pain.  Denies fevers.  denies lower extremity paralysis, paraesthesias or weakness.  Denies dysuria, urinary frequency or hematuria. Denies weight loss or night sweats.   Modifiers:  Movement, bending and twisting exacerbate pain.  Rest partially relieves pain.  Context:  Patient reports no acute fall or trauma.  Denies prior history of IVDA, prolonged steroid use or immunocompromised state. States symptoms started after wrestling with her kids 2 weeks ago.           History provided by:  Patient   used: No        Prior to Admission Medications   Prescriptions Last Dose Informant Patient Reported? Taking?   dicyclomine (BENTYL) 20 mg tablet   No No   Sig: Take 1 tablet (20 mg total) by mouth 2 (two) times a day   ondansetron (Zofran ODT) 4 mg disintegrating tablet   No No   Sig: Take 1 tablet (4 mg total) by mouth every 8 (eight) hours as needed for nausea or vomiting      Facility-Administered Medications: None       Past Medical History:   Diagnosis Date    ADHD     Anxiety     Bipolar 1 disorder (HCC)     Chemical pneumonitis (HCC)     Depression     Gastritis     Hypertension     PTSD (post-traumatic stress disorder)        Past Surgical History:   Procedure Laterality Date    WISDOM TOOTH EXTRACTION         Family History   Problem Relation Age of Onset    Stroke Mother     Heart disease Mother     Bipolar disorder Father     Heart disease Sister     Ovarian cysts  Sister      I have reviewed and agree with the history as documented.    E-Cigarette/Vaping    E-Cigarette Use Never User      E-Cigarette/Vaping Substances    Nicotine No     THC No     CBD No     Flavoring No     Other No     Unknown No      Social History     Tobacco Use    Smoking status: Every Day     Current packs/day: 0.50     Types: Cigarettes    Smokeless tobacco: Never   Vaping Use    Vaping status: Never Used   Substance Use Topics    Alcohol use: Yes     Comment: social    Drug use: Yes     Types: Marijuana       Review of Systems   Constitutional:  Negative for appetite change, diaphoresis, fever and unexpected weight change.   Respiratory:  Negative for chest tightness, shortness of breath and stridor.    Cardiovascular:  Negative for chest pain.   Musculoskeletal:  Positive for back pain. Negative for gait problem.   Skin:  Negative for rash.   Neurological:  Negative for seizures, syncope, facial asymmetry, weakness and numbness.   All other systems reviewed and are negative.      Physical Exam  Physical Exam  Vitals and nursing note reviewed.   Constitutional:       General: He is not in acute distress.     Appearance: Normal appearance.   HENT:      Head: Normocephalic and atraumatic.      Right Ear: External ear normal.      Left Ear: External ear normal.      Nose: Nose normal.   Eyes:      General: No scleral icterus.        Right eye: No discharge.         Left eye: No discharge.   Cardiovascular:      Rate and Rhythm: Normal rate.      Pulses: Normal pulses.   Pulmonary:      Effort: Pulmonary effort is normal.      Breath sounds: Normal breath sounds.   Musculoskeletal:         General: Tenderness present. No deformity.      Cervical back: Normal range of motion and neck supple.        Back:    Skin:     General: Skin is dry.      Coloration: Skin is not jaundiced.      Findings: No erythema or rash.   Neurological:      General: No focal deficit present.      Mental Status: He is alert and  oriented to person, place, and time. Mental status is at baseline.      Motor: No weakness.      Gait: Gait normal.   Psychiatric:         Mood and Affect: Mood normal.         Behavior: Behavior normal.         Thought Content: Thought content normal.         Vital Signs  ED Triage Vitals [01/04/24 0953]   Temperature Pulse Respirations Blood Pressure SpO2   98.2 °F (36.8 °C) 100 16 157/85 98 %      Temp src Heart Rate Source Patient Position - Orthostatic VS BP Location FiO2 (%)   -- -- -- -- --      Pain Score       --           Vitals:    01/04/24 0953   BP: 157/85   Pulse: 100         Visual Acuity      ED Medications  Medications   ibuprofen (MOTRIN) tablet 600 mg (has no administration in time range)       Diagnostic Studies  Results Reviewed       None                   No orders to display              Procedures  Procedures         ED Course                                             Medical Decision Making  MDM: ddx includes but is not limited to:  Myofascial pain, diskogenic pain, radicular pain, muscle spasm, vertebral compression fracture, spinal stenosis, spondylosis, cancer, osteoporosis, OA, RA, consider but doubt AAA, epidural abscess, cauda equina, discitis.     Patient meets Back pain low risk criteria, no fever chills or weight loss, no neurological deficit, no history of cancer, no history of injecting drugs, pain improved with rest.    No red flag back pain signs/symptoms on exam:  Clinical exam is reassuring.  Pain improved with rest,  Pain relieved by lying flat, worse with bending/twisthing.  No swelling to low back or lower extremities, no pulsations in leg or thigh.   No severe weakness or loss of sensation to low back, lower extremities, perineum, or genitals. No fevers, chills, nights sweats or unexplained weight loss.  No change in color of the skin over the legs or feet (vascular insufficiency) or unhealing lower extremity wounds.  No partial or total loss of bladder and/or bowel  control, no difficulty in passing urine or having a bowel movement, no hematuria.  Pain not severe or intractable in ED.      Given the large differential diagnosis for this patient, the decision making in this case is of high complexity.    ED Summary:     -Past Medical History, Surgical History, Social History, Medications and allergies reviewed   -No red flag back pain signs or symptoms.    -MRI not indicated.   -No Admission Criteria Present:    -Patient without extreme pain refractory to medical treatment.      Dx:   1) Acute Low back Pain    Tx:  -  Will treat for relief of acute symptoms, but discussed does not offer long lasting benefit.   -  NSAIDs or OTC Tylenol for Pain if no contraindication  -  Robaxin for muscle spasm.    -  tylenol #3 for severe pain. Standard narcotic precautions given.    -  Discussed rest, ice, use of back exercises and stretching.   -  Reviewed diagnosis of musculoskeletal back pain/Patient education performed.    -  Instructed to follow up with pcp and orthopedics/ physical therapy or neurosurgery for further evaluation   -  I discussed diagnosis and treatment plan with patient at bedside.    -  Extended discussion with patient regarding the diagnosis, pathophysiology, expectant coarse and treatment plan.    -  Reviewed reasons to return to ed.    -  Patient verbalized understanding of diagnosis and agreement with discharge plan of care as well as understanding of reasons to return to ed.    Disclaimers:    I have reasonably determine that electronically prescribing a controlled substance would be impractical for the patient to obtain the controlled substance prescribed by electronic prescription or would cause an untimely delay resulting in an adverse impact on the patient's medical condition .      Patient was seen during the outbreak of the corona virus epidemic.  Resources are limited due to the severity of patient illnesses associated with virus.  Testing is also limited at  this time.  Discussed with patient at the time of this evaluation.  Due to the fact that limited resources are available -treatment options are limited.      Risk  Prescription drug management.             Disposition  Final diagnoses:   Acute low back pain     Time reflects when diagnosis was documented in both MDM as applicable and the Disposition within this note       Time User Action Codes Description Comment    1/4/2024 10:16 AM Eh Kelly Add [M54.50] Acute low back pain           ED Disposition       ED Disposition   Discharge    Condition   Stable    Date/Time   Thu Jan 4, 2024 10:16 AM    Comment   Yanci Soler discharge to home/self care.                   Follow-up Information       Follow up With Specialties Details Why Contact Info Additional Information    [facility] Emergency Department Emergency Medicine Go to  If symptoms worsen [address] [facility] [address] [phone] [facility] Emergency Department, [address] [place], [place], [address]    Zachary Zhang MD Family Medicine Call in 1 week for further evaluation of symptoms [address]  [facility] [address] [phone]       Sarwat Kennedy DO Sports Medicine Call in 2 weeks for further evaluation of symptoms, follow up as needed [address]  [address]  [facility] [address]  [phone]               Patient's Medications   Discharge Prescriptions    ACETAMINOPHEN-CODEINE (TYLENOL WITH CODEINE #3) 300-30 MG PER TABLET    Take 1 tablet by mouth every 6 (six) hours as needed for moderate pain (back pain/initial rx.)       Start Date: 1/4/2024  End Date: --       Order Dose: 1 tablet       Quantity: 15 tablet    Refills: 0    IBUPROFEN (MOTRIN) 400 MG TABLET    Take 1 tablet (400 mg total) by mouth every 6 (six) hours as needed for moderate pain       Start Date: 1/4/2024  End Date: --       Order Dose: 400 mg       Quantity: 30 tablet     Refills: 0    METHOCARBAMOL (ROBAXIN) 750 MG TABLET    Take 1 tablet (750 mg total) by mouth 4 (four) times a day for 7 days       Start Date: 1/4/2024  End Date: 1/11/2024       Order Dose: 750 mg       Quantity: 28 tablet    Refills: 0       No discharge procedures on file.    PDMP Review       None            ED Provider  Electronically Signed by             hE Kelly PA-C  01/04/24 2155

## 2025-08-16 ENCOUNTER — APPOINTMENT (OUTPATIENT)
Dept: RADIOLOGY | Facility: HOSPITAL | Age: 25
End: 2025-08-16
Payer: COMMERCIAL

## 2025-08-16 ENCOUNTER — HOSPITAL ENCOUNTER (EMERGENCY)
Facility: HOSPITAL | Age: 25
Discharge: HOME | End: 2025-08-16
Attending: EMERGENCY MEDICINE | Admitting: EMERGENCY MEDICINE
Payer: COMMERCIAL

## 2025-08-16 VITALS
HEIGHT: 67 IN | SYSTOLIC BLOOD PRESSURE: 171 MMHG | WEIGHT: 293 LBS | RESPIRATION RATE: 19 BRPM | HEART RATE: 94 BPM | BODY MASS INDEX: 45.99 KG/M2 | TEMPERATURE: 98.2 F | OXYGEN SATURATION: 98 % | DIASTOLIC BLOOD PRESSURE: 92 MMHG

## 2025-08-16 DIAGNOSIS — S69.92XA INJURY OF LEFT THUMB, INITIAL ENCOUNTER: Primary | ICD-10-CM

## 2025-08-16 PROCEDURE — 99283 EMERGENCY DEPT VISIT LOW MDM: CPT

## 2025-08-16 PROCEDURE — 73140 X-RAY EXAM OF FINGER(S): CPT
